# Patient Record
Sex: MALE | Race: BLACK OR AFRICAN AMERICAN | NOT HISPANIC OR LATINO | Employment: UNEMPLOYED | URBAN - METROPOLITAN AREA
[De-identification: names, ages, dates, MRNs, and addresses within clinical notes are randomized per-mention and may not be internally consistent; named-entity substitution may affect disease eponyms.]

---

## 2023-03-18 ENCOUNTER — HOSPITAL ENCOUNTER (INPATIENT)
Facility: HOSPITAL | Age: 38
LOS: 10 days | Discharge: OTHER INSTITUTION | End: 2023-03-30
Attending: EMERGENCY MEDICINE | Admitting: STUDENT IN AN ORGANIZED HEALTH CARE EDUCATION/TRAINING PROGRAM

## 2023-03-18 DIAGNOSIS — G47.00 INSOMNIA: ICD-10-CM

## 2023-03-18 DIAGNOSIS — R03.0 ELEVATED BLOOD PRESSURE READING: ICD-10-CM

## 2023-03-18 DIAGNOSIS — R45.851 SUICIDAL IDEATIONS: Primary | ICD-10-CM

## 2023-03-18 DIAGNOSIS — F33.9 MAJOR DEPRESSIVE DISORDER, RECURRENT, UNSPECIFIED (HCC): ICD-10-CM

## 2023-03-18 DIAGNOSIS — Z00.8 MEDICAL CLEARANCE FOR PSYCHIATRIC ADMISSION: ICD-10-CM

## 2023-03-18 DIAGNOSIS — E55.9 VITAMIN D DEFICIENCY: ICD-10-CM

## 2023-03-18 DIAGNOSIS — F10.11 HISTORY OF ALCOHOL ABUSE: ICD-10-CM

## 2023-03-18 LAB
ALBUMIN SERPL BCP-MCNC: 4.6 G/DL (ref 3.5–5)
ALP SERPL-CCNC: 44 U/L (ref 34–104)
ALT SERPL W P-5'-P-CCNC: 39 U/L (ref 7–52)
AMPHETAMINES SERPL QL SCN: NEGATIVE
ANION GAP SERPL CALCULATED.3IONS-SCNC: 11 MMOL/L (ref 4–13)
APTT PPP: 26 SECONDS (ref 23–37)
AST SERPL W P-5'-P-CCNC: 37 U/L (ref 13–39)
BARBITURATES UR QL: NEGATIVE
BASOPHILS # BLD AUTO: 0.04 THOUSANDS/ÂΜL (ref 0–0.1)
BASOPHILS NFR BLD AUTO: 1 % (ref 0–1)
BENZODIAZ UR QL: NEGATIVE
BILIRUB SERPL-MCNC: 1.21 MG/DL (ref 0.2–1)
BUN SERPL-MCNC: 10 MG/DL (ref 5–25)
CALCIUM SERPL-MCNC: 10 MG/DL (ref 8.4–10.2)
CHLORIDE SERPL-SCNC: 98 MMOL/L (ref 96–108)
CO2 SERPL-SCNC: 28 MMOL/L (ref 21–32)
COCAINE UR QL: NEGATIVE
CREAT SERPL-MCNC: 0.85 MG/DL (ref 0.6–1.3)
EOSINOPHIL # BLD AUTO: 0.15 THOUSAND/ÂΜL (ref 0–0.61)
EOSINOPHIL NFR BLD AUTO: 2 % (ref 0–6)
ERYTHROCYTE [DISTWIDTH] IN BLOOD BY AUTOMATED COUNT: 13.2 % (ref 11.6–15.1)
GFR SERPL CREATININE-BSD FRML MDRD: 110 ML/MIN/1.73SQ M
GLUCOSE SERPL-MCNC: 110 MG/DL (ref 65–140)
HCT VFR BLD AUTO: 45.2 % (ref 36.5–49.3)
HGB BLD-MCNC: 14.4 G/DL (ref 12–17)
IMM GRANULOCYTES # BLD AUTO: 0.03 THOUSAND/UL (ref 0–0.2)
IMM GRANULOCYTES NFR BLD AUTO: 1 % (ref 0–2)
INR PPP: 0.95 (ref 0.84–1.19)
LYMPHOCYTES # BLD AUTO: 2.46 THOUSANDS/ÂΜL (ref 0.6–4.47)
LYMPHOCYTES NFR BLD AUTO: 37 % (ref 14–44)
MAGNESIUM SERPL-MCNC: 1.8 MG/DL (ref 1.9–2.7)
MCH RBC QN AUTO: 26.7 PG (ref 26.8–34.3)
MCHC RBC AUTO-ENTMCNC: 31.9 G/DL (ref 31.4–37.4)
MCV RBC AUTO: 84 FL (ref 82–98)
METHADONE UR QL: NEGATIVE
MONOCYTES # BLD AUTO: 0.4 THOUSAND/ÂΜL (ref 0.17–1.22)
MONOCYTES NFR BLD AUTO: 6 % (ref 4–12)
NEUTROPHILS # BLD AUTO: 3.54 THOUSANDS/ÂΜL (ref 1.85–7.62)
NEUTS SEG NFR BLD AUTO: 53 % (ref 43–75)
NRBC BLD AUTO-RTO: 0 /100 WBCS
OPIATES UR QL SCN: NEGATIVE
OXYCODONE+OXYMORPHONE UR QL SCN: NEGATIVE
PCP UR QL: NEGATIVE
PLATELET # BLD AUTO: 263 THOUSANDS/UL (ref 149–390)
PLATELET # BLD AUTO: 263 THOUSANDS/UL (ref 149–390)
PMV BLD AUTO: 10.3 FL (ref 8.9–12.7)
PMV BLD AUTO: 10.3 FL (ref 8.9–12.7)
POTASSIUM SERPL-SCNC: 3.7 MMOL/L (ref 3.5–5.3)
PROT SERPL-MCNC: 8 G/DL (ref 6.4–8.4)
PROTHROMBIN TIME: 12.9 SECONDS (ref 11.6–14.5)
RBC # BLD AUTO: 5.4 MILLION/UL (ref 3.88–5.62)
SODIUM SERPL-SCNC: 137 MMOL/L (ref 135–147)
THC UR QL: NEGATIVE
WBC # BLD AUTO: 6.62 THOUSAND/UL (ref 4.31–10.16)

## 2023-03-18 RX ORDER — MAGNESIUM CHLORIDE 64 MG
64 TABLET, DELAYED RELEASE (ENTERIC COATED) ORAL ONCE
Status: COMPLETED | OUTPATIENT
Start: 2023-03-18 | End: 2023-03-18

## 2023-03-18 RX ORDER — HYDROCHLOROTHIAZIDE 25 MG/1
12.5 TABLET ORAL ONCE
Status: COMPLETED | OUTPATIENT
Start: 2023-03-18 | End: 2023-03-18

## 2023-03-18 RX ORDER — LANOLIN ALCOHOL/MO/W.PET/CERES
100 CREAM (GRAM) TOPICAL DAILY
Status: DISCONTINUED | OUTPATIENT
Start: 2023-03-19 | End: 2023-03-30 | Stop reason: HOSPADM

## 2023-03-18 RX ORDER — FOLIC ACID 1 MG/1
1 TABLET ORAL DAILY
Status: DISCONTINUED | OUTPATIENT
Start: 2023-03-19 | End: 2023-03-30 | Stop reason: HOSPADM

## 2023-03-18 RX ADMIN — HYDROCHLOROTHIAZIDE 12.5 MG: 25 TABLET ORAL at 23:46

## 2023-03-18 RX ADMIN — MAGNESIUM 64 MG (MAGNESIUM CHLORIDE) TABLET,DELAYED RELEASE 64 MG: at 22:23

## 2023-03-18 NOTE — ED PROVIDER NOTES
"History  Chief Complaint   Patient presents with   • Psychiatric Evaluation     Patient reports to ED for increased depression and vague SI  States he has become overwhelmed by his sex and alcohol addiction, Has past history of suicide attempt 5 years ago by OD on pills and alcohol  Denies HI, AH and VH  Reports he can drink up to a bottle of [place] daily  59-year-old male with past medical history of severe alcohol abuse, excessive sexual drive, presents for evaluation of passive SI  Patient tearfully reports that he is tired of his behavior secondary to his excessive sexual drive and alcoholism as these things have caused him to [place] his family and job  \"  He reports feeling very hopeless  Patient denies any active SI/HI at this time, however has a history of suicide attempt 5 years ago  He denies any visual/auditory hallucinations and denies any recent symptoms such as fever/chills, cough/cold symptoms, chest pain, abdominal pain  Patient's last drink was yesterday evening when he drank [place] big bottle of Kael\" which is his drink of choice daily  He states he is interested in getting help for both his alcoholism and excessive sexual drive  No other concerns  None       History reviewed  No pertinent past medical history  History reviewed  No pertinent surgical history  History reviewed  No pertinent family history  I have reviewed and agree with the history as documented  E-Cigarette/Vaping   • E-Cigarette Use Never User      E-Cigarette/Vaping Substances     Social History     Tobacco Use   • Smoking status: Never   • Smokeless tobacco: Never   Vaping Use   • Vaping Use: Never used   Substance Use Topics   • Alcohol use: Yes     Alcohol/week: 8 0 standard drinks     Types: 8 Shots of liquor per week   • Drug use: Never        Review of Systems   Constitutional: Negative for chills and fever  HENT: Negative for ear pain and sore throat      Eyes: Negative for pain and visual " disturbance  Respiratory: Negative for cough and shortness of breath  Cardiovascular: Negative for chest pain and palpitations  Gastrointestinal: Negative for abdominal pain and vomiting  Genitourinary: Negative for dysuria and hematuria  Musculoskeletal: Negative for arthralgias and back pain  Skin: Negative for color change and rash  Neurological: Negative for seizures and syncope  Psychiatric/Behavioral: Positive for suicidal ideas  Negative for hallucinations and self-injury  All other systems reviewed and are negative  Physical Exam  ED Triage Vitals [03/18/23 1918]   Temperature Pulse Respirations Blood Pressure SpO2   98 °F (36 7 °C) 72 20 (!) 188/111 96 %      Temp Source Heart Rate Source Patient Position - Orthostatic VS BP Location FiO2 (%)   Oral Monitor Sitting Left arm --      Pain Score       --             Orthostatic Vital Signs  Vitals:    03/18/23 1918   BP: (!) 188/111   Pulse: 72   Patient Position - Orthostatic VS: Sitting       Physical Exam  Vitals and nursing note reviewed  Constitutional:       General: He is not in acute distress  Appearance: Normal appearance  He is well-developed  He is not ill-appearing, toxic-appearing or diaphoretic  HENT:      Head: Normocephalic and atraumatic  Right Ear: External ear normal       Left Ear: External ear normal       Nose: Nose normal       Mouth/Throat:      Mouth: Mucous membranes are moist    Eyes:      Extraocular Movements: Extraocular movements intact  Conjunctiva/sclera: Conjunctivae normal    Cardiovascular:      Rate and Rhythm: Normal rate and regular rhythm  Heart sounds: No murmur heard  Pulmonary:      Effort: Pulmonary effort is normal  No respiratory distress  Breath sounds: Normal breath sounds  No wheezing or rales  Chest:      Chest wall: No tenderness  Abdominal:      General: Abdomen is flat  Palpations: Abdomen is soft  Tenderness:  There is no abdominal tenderness  There is no right CVA tenderness, left CVA tenderness, guarding or rebound  Musculoskeletal:         General: No swelling  Normal range of motion  Cervical back: Normal range of motion and neck supple  Right lower leg: No edema  Left lower leg: No edema  Skin:     General: Skin is warm and dry  Capillary Refill: Capillary refill takes less than 2 seconds  Neurological:      Mental Status: He is alert and oriented to person, place, and time  Mental status is at baseline  Psychiatric:         Behavior: Behavior normal          Thought Content: Thought content normal          Judgment: Judgment normal       Comments: Tearful         ED Medications  Medications   thiamine tablet 100 mg (has no administration in time range)   folic acid (FOLVITE) tablet 1 mg (has no administration in time range)   multivitamin-minerals (CENTRUM) tablet 1 tablet (has no administration in time range)   magnesium chloride (MAG64) EC tablet TBEC 64 mg (64 mg Oral Given 3/18/23 2223)       Diagnostic Studies  Results Reviewed     Procedure Component Value Units Date/Time    Rapid drug screen, urine [[id number]]  (Normal) Collected: 03/18/23 2138    Lab Status: Final result Specimen: Urine, Clean Catch Updated: 03/18/23 2159     Amph/Meth UR Negative     Barbiturate Ur Negative     Benzodiazepine Urine Negative     Cocaine Urine Negative     Methadone Urine Negative     Opiate Urine Negative     PCP Ur Negative     THC Urine Negative     Oxycodone Urine Negative    Narrative:      FOR MEDICAL PURPOSES ONLY  IF CONFIRMATION NEEDED PLEASE CONTACT THE LAB WITHIN 5 DAYS      Drug Screen Cutoff Levels:  AMPHETAMINE/METHAMPHETAMINES  1000 ng/mL  BARBITURATES     200 ng/mL  BENZODIAZEPINES     200 ng/mL  COCAINE      300 ng/mL  METHADONE      300 ng/mL  OPIATES      300 ng/mL  PHENCYCLIDINE     25 ng/mL  THC       50 ng/mL  OXYCODONE      100 ng/mL    Comprehensive metabolic panel [[id number]]  (Abnormal) Collected: 03/18/23 2028    Lab Status: Final result Specimen: Blood from Arm, Left Updated: 03/18/23 2122     Sodium 137 mmol/L      Potassium 3 7 mmol/L      Chloride 98 mmol/L      CO2 28 mmol/L      ANION GAP 11 mmol/L      BUN 10 mg/dL      Creatinine 0 85 mg/dL      Glucose 110 mg/dL      Calcium 10 0 mg/dL      AST 37 U/L      ALT 39 U/L      Alkaline Phosphatase 44 U/L      Total Protein 8 0 g/dL      Albumin 4 6 g/dL      Total Bilirubin 1 21 mg/dL      eGFR 110 ml/min/1 73sq m     Narrative:      [facility] guidelines for Chronic Kidney Disease (CKD):   •  Stage 1 with normal or high GFR (GFR > 90 mL/min/1 73 square meters)  •  Stage 2 Mild CKD (GFR = 60-89 mL/min/1 73 square meters)  •  Stage 3A Moderate CKD (GFR = 45-59 mL/min/1 73 square meters)  •  Stage 3B Moderate CKD (GFR = 30-44 mL/min/1 73 square meters)  •  Stage 4 Severe CKD (GFR = 15-29 mL/min/1 73 square meters)  •  Stage 5 End Stage CKD (GFR <15 mL/min/1 73 square meters)  Note: GFR calculation is accurate only with a steady state creatinine    Magnesium [[id number]]  (Abnormal) Collected: 03/18/23 2028    Lab Status: Final result Specimen: Blood from Arm, Left Updated: 03/18/23 2122     Magnesium 1 8 mg/dL     Protime-INR [[id number]]  (Normal) Collected: 03/18/23 2028    Lab Status: Final result Specimen: Blood from Arm, Left Updated: 03/18/23 2115     Protime 12 9 seconds      INR 0 95    APTT [[id number]]  (Normal) Collected: 03/18/23 2028    Lab Status: Final result Specimen: Blood from Arm, Left Updated: 03/18/23 2115     PTT 26 seconds     CBC and differential [[id number]]  (Abnormal) Collected: 03/18/23 2028    Lab Status: Final result Specimen: Blood from Arm, Left Updated: 03/18/23 2103     WBC 6 62 Thousand/uL      RBC 5 40 Million/uL      Hemoglobin 14 4 g/dL      Hematocrit 45 2 %      MCV 84 fL      MCH 26 7 pg      MCHC 31 9 g/dL      RDW 13 2 %      MPV 10 3 fL      Platelets 454 Thousands/uL      nRBC 0 /100 WBCs      Neutrophils Relative 53 %      Immat GRANS % 1 %      Lymphocytes Relative 37 %      Monocytes Relative 6 %      Eosinophils Relative 2 %      Basophils Relative 1 %      Neutrophils Absolute 3 54 Thousands/µL      Immature Grans Absolute 0 03 Thousand/uL      Lymphocytes Absolute 2 46 Thousands/µL      Monocytes Absolute 0 40 Thousand/µL      Eosinophils Absolute 0 15 Thousand/µL      Basophils Absolute 0 04 Thousands/µL     Platelet count [[id number]]  (Normal) Collected: 03/18/23 2028    Lab Status: Final result Specimen: Blood from Arm, Left Updated: 03/18/23 2103     Platelets 476 Thousands/uL      MPV 10 3 fL     Equal mix, APTT [[id number]] Collected: 03/18/23 2028    Lab Status: In process Specimen: Blood from Arm, Left Updated: 03/18/23 2101    Thrombin Time Mixing Study [[id number]] Collected: 03/18/23 2028    Lab Status: In process Specimen: Blood from Arm, Left Updated: 03/18/23 2101    Fibrinogen [[id number]] Collected: 03/18/23 2028    Lab Status: In process Specimen: Blood from Arm, Left Updated: 03/18/23 2101    Equal mix, PT / INR [[id number]] Collected: 03/18/23 2028    Lab Status: In process Specimen: Blood from Arm, Left Updated: 03/18/23 2101    Thrombin time [[id number]] Collected: 03/18/23 2028    Lab Status: In process Specimen: Blood from Arm, Left Updated: 03/18/23 2101    POCT alcohol breath test [[id number]]  (Normal) Resulted: 03/18/23 0000    Lab Status: Final result Updated: 03/18/23 2016                 No orders to display         Procedures  Procedures      ED Course  ED Course as of 03/18/23 2231   Sat Mar 18, 2023   2139 Magnesium(!): 1 8  Will replete                                         Medical Decision Making  Pt is medically clear for psychiatric placement/further evaluation  Remained stable throughout ED course  CBC, CMP, mag, coag profile, WILLIAM, UDS unremarkable aside from mild hypomagnesemia for which patient was provided IV magnesium sulfate      Given lack of any alcohol withdrawal symptoms with his last drink having been about 24 hours ago, although patient reported wanting further assistance tissue sobriety, patient is not appropriate for the detox unit at this time  Patient will undergo further assessment via ED crisis worker and will be placed to an inpatient psychiatric facility or be provided further outpatient resources  Pt understands and agreed with plan  All questions answered  No other concerns  Amount and/or Complexity of Data Reviewed  Labs: ordered  Decision-making details documented in ED Course  Risk  OTC drugs  Disposition  Final diagnoses:   Suicidal ideations   History of alcohol abuse     Time reflects when diagnosis was documented in both MDM as applicable and the Disposition within this note     Time User Action Codes Description Comment    3/18/2023 10:31 PM Willa Maurer Add [R45 851] Suicidal ideations     3/18/2023 10:31 PM LeydaIsabell Add [F10 11] History of alcohol abuse       ED Disposition     None      Follow-up Information    None         Patient's Medications    No medications on file     No discharge procedures on file  PDMP Review     None           ED Provider  Attending physically available and evaluated Monika Mosher I managed the patient along with the ED Attending      Electronically Signed by         Missael Salcido MD  03/18/23 3479

## 2023-03-19 LAB
FIBRINOGEN PPP-MCNC: 310 MG/DL (ref 227–495)
THROMBIN TIME: 18 SECONDS (ref 14.7–18.4)

## 2023-03-19 RX ORDER — LORAZEPAM 1 MG/1
1 TABLET ORAL EVERY 6 HOURS PRN
Status: DISCONTINUED | OUTPATIENT
Start: 2023-03-19 | End: 2023-03-19

## 2023-03-19 RX ORDER — HYDROCHLOROTHIAZIDE 12.5 MG/1
12.5 TABLET ORAL DAILY
Status: DISCONTINUED | OUTPATIENT
Start: 2023-03-19 | End: 2023-03-30 | Stop reason: HOSPADM

## 2023-03-19 RX ORDER — LORAZEPAM 1 MG/1
1 TABLET ORAL
Status: DISCONTINUED | OUTPATIENT
Start: 2023-03-19 | End: 2023-03-30 | Stop reason: HOSPADM

## 2023-03-19 RX ORDER — LORAZEPAM 1 MG/1
1 TABLET ORAL ONCE
Status: COMPLETED | OUTPATIENT
Start: 2023-03-19 | End: 2023-03-19

## 2023-03-19 RX ORDER — HYDROCHLOROTHIAZIDE 25 MG/1
TABLET ORAL
Status: COMPLETED
Start: 2023-03-19 | End: 2023-03-19

## 2023-03-19 RX ORDER — LORAZEPAM 1 MG/1
2 TABLET ORAL ONCE
Status: COMPLETED | OUTPATIENT
Start: 2023-03-19 | End: 2023-03-19

## 2023-03-19 RX ADMIN — LORAZEPAM 2 MG: 1 TABLET ORAL at 21:47

## 2023-03-19 RX ADMIN — HYDROCHLOROTHIAZIDE 12.5 MG: 25 TABLET ORAL at 09:06

## 2023-03-19 RX ADMIN — MULTIPLE VITAMINS W/ MINERALS TAB 1 TABLET: TAB ORAL at 09:07

## 2023-03-19 RX ADMIN — LORAZEPAM 1 MG: 1 TABLET ORAL at 02:00

## 2023-03-19 RX ADMIN — THIAMINE HCL TAB 100 MG 100 MG: 100 TAB at 09:06

## 2023-03-19 RX ADMIN — FOLIC ACID 1 MG: 1 TABLET ORAL at 09:06

## 2023-03-19 NOTE — ED NOTES
Patient had received 2 doses of hydrodiural 12 5mg earlier in  the shift for his elevated BP  Patient also had been medicated with ativan as aide in sleep which patient finally agreed to take after asking patient several times before that if he needed anything to help  He did state that he has not been sleeping well  BP this morning within a decent range and patient had stated that he slept well  He remains depressed and still has suicidal thoughts, no plan at this time and feels relatively safe here  1:1 continues at bedside  Offers no complaints       Luis Felipe Jalloh RN  03/19/23 4781

## 2023-03-19 NOTE — ED NOTES
"Patient noted to be tearful, 1:1 sitter requesting us to speak with patient  RN asked patient if he needed anything at this time, patient states \"No i'm okay\"       Eric Carpio RN  03/19/23 1128    "

## 2023-03-19 NOTE — ED NOTES
Pt presented to the ED due to worsening depression and suicidal ideation  Pt also reported sex and alcohol addictions that have become problematic causing him to lose his family/home and job  Pt recently became homeless and unemployed ~2 weeks ago  Pt does not elaborate on the sex addiction, but reports daily alcohol use recently  He denies any history of withdrawal seizures  Pt was last hospitalized 6 years ago while residing in [place]  His depression increased a couple of months ago, and symptoms have been worsening over the last 2 weeks  He does not currently have any outpatient supports or prescribed a medication regimen  He denies any substance abuse  He denies any access to firearms  Pt reports thinking of several different plans/ways he could harm himself if discharged, but does not elaborate further  Pt reports a prior suicide attempt via overdose on pills leading up to his last hospitalization  He denies any legal issues  He reports decreased sleep and decreased appetite  Pt denies HI/AH/VH  Pt is in agreement with signing a 201  He does not feel he is safe to participate in alcohol use treatment without being stablized first with his mental health  ED attending isis sifuentes  Pt understands his voluntary rights

## 2023-03-19 NOTE — ED NOTES
EVS (Eligibility Verification System) called - [phone]  Automated system indicates: Pt SSN is not on file, cannot verify eligibility     Per [facility], Pt is inactive

## 2023-03-19 NOTE — ED NOTES
Patient is speaking with psychiatry, 1:1 remains at the bedside       Rajesh River RN  03/19/23 0268

## 2023-03-19 NOTE — ED NOTES
Pt provided with eye mask/ear plugs to assist with falling asleep  Nurse also medicated for the same  Pt states after trying ear plugs for a few minutes that he can't use them because of voices in his head  Pt encouraged to utilize mask  Pt has no other needs @ this time  All safety precautions remain in place including 1:1 @ bedside       Alex Hopkins  03/19/23 4562

## 2023-03-19 NOTE — ED NOTES
Assumed care of pt at this time  Pt resting with no signs of distress noted  1:1 in progress  Will continue to monitor        Himanshu Lee RN  03/19/23 3636
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
## 2023-03-19 NOTE — ED ATTENDING ATTESTATION
Panel Management Review      Patient has the following on his problem list: None      Composite cancer screening  Chart review shows that this patient is due/due soon for the following Colonoscopy  Summary:    Patient is due/failing the following:   COLONOSCOPY    Action needed:   Patient needs office visit for colonoscopy.    Type of outreach:    Sent letter.    Questions for provider review:    None                                                                                                                                    Jaime Crews [facility]       Chart routed to Care Team .           3/18/2023  ITamela MD, saw and evaluated the patient  I have discussed the patient with the resident/non-physician practitioner and agree with the resident's/non-physician practitioner's findings, Plan of Care, and MDM as documented in the resident's/non-physician practitioner's note, except where noted  All available labs and Radiology studies were reviewed  I was present for key portions of any procedure(s) performed by the resident/non-physician practitioner and I was immediately available to provide assistance  At this point I agree with the current assessment done in the Emergency Department  I have conducted an independent evaluation of this patient a history and physical is as follows:    ED Course   59-year-old male past medical history of severe alcohol abuse presenting here today for alcohol withdrawal and passive SI no history of complicated withdrawal no significant withdrawal symptoms at this time vital signs with hypertension otherwise reassuring  Physical exam reassuring  Does report worsening depression and passive suicidal ideation without any attempt or specific plan  Has been drinking [place] daily  Will obtain labs and continue to monitor for withdrawal   Alcohol is negative here and he developed no signs of acute withdrawal low concern for DVT or withdrawal seizures  Will discuss with crisis regarding SI  He did sign a 201  Signed out pending bed placement  Labs reassuring        Critical Care Time  Procedures

## 2023-03-20 RX ORDER — PROPRANOLOL HYDROCHLORIDE 10 MG/1
10 TABLET ORAL EVERY 8 HOURS PRN
Status: DISCONTINUED | OUTPATIENT
Start: 2023-03-20 | End: 2023-03-30 | Stop reason: HOSPADM

## 2023-03-20 RX ORDER — OLANZAPINE 10 MG/1
2.5 INJECTION, POWDER, LYOPHILIZED, FOR SOLUTION INTRAMUSCULAR
Status: DISCONTINUED | OUTPATIENT
Start: 2023-03-20 | End: 2023-03-30 | Stop reason: HOSPADM

## 2023-03-20 RX ORDER — OLANZAPINE 10 MG/1
5 INJECTION, POWDER, LYOPHILIZED, FOR SOLUTION INTRAMUSCULAR
Status: DISCONTINUED | OUTPATIENT
Start: 2023-03-20 | End: 2023-03-30 | Stop reason: HOSPADM

## 2023-03-20 RX ORDER — POLYETHYLENE GLYCOL 3350 17 G/17G
17 POWDER, FOR SOLUTION ORAL DAILY PRN
Status: DISCONTINUED | OUTPATIENT
Start: 2023-03-20 | End: 2023-03-30 | Stop reason: HOSPADM

## 2023-03-20 RX ORDER — ACETAMINOPHEN 325 MG/1
650 TABLET ORAL EVERY 6 HOURS PRN
Status: DISCONTINUED | OUTPATIENT
Start: 2023-03-20 | End: 2023-03-30 | Stop reason: HOSPADM

## 2023-03-20 RX ORDER — MAGNESIUM HYDROXIDE/ALUMINUM HYDROXICE/SIMETHICONE 120; 1200; 1200 MG/30ML; MG/30ML; MG/30ML
30 SUSPENSION ORAL EVERY 4 HOURS PRN
Status: DISCONTINUED | OUTPATIENT
Start: 2023-03-20 | End: 2023-03-30 | Stop reason: HOSPADM

## 2023-03-20 RX ORDER — HYDROXYZINE HYDROCHLORIDE 25 MG/1
25 TABLET, FILM COATED ORAL
Status: DISCONTINUED | OUTPATIENT
Start: 2023-03-20 | End: 2023-03-30 | Stop reason: HOSPADM

## 2023-03-20 RX ORDER — OLANZAPINE 5 MG/1
5 TABLET ORAL
Status: DISCONTINUED | OUTPATIENT
Start: 2023-03-20 | End: 2023-03-30 | Stop reason: HOSPADM

## 2023-03-20 RX ORDER — HYDROXYZINE 50 MG/1
100 TABLET, FILM COATED ORAL
Status: DISCONTINUED | OUTPATIENT
Start: 2023-03-20 | End: 2023-03-30 | Stop reason: HOSPADM

## 2023-03-20 RX ORDER — TRAZODONE HYDROCHLORIDE 50 MG/1
50 TABLET ORAL
Status: DISCONTINUED | OUTPATIENT
Start: 2023-03-20 | End: 2023-03-30 | Stop reason: HOSPADM

## 2023-03-20 RX ORDER — LORAZEPAM 2 MG/ML
2 INJECTION INTRAMUSCULAR EVERY 6 HOURS PRN
Status: DISCONTINUED | OUTPATIENT
Start: 2023-03-20 | End: 2023-03-30 | Stop reason: HOSPADM

## 2023-03-20 RX ORDER — DIPHENHYDRAMINE HYDROCHLORIDE 50 MG/ML
50 INJECTION INTRAMUSCULAR; INTRAVENOUS EVERY 6 HOURS PRN
Status: DISCONTINUED | OUTPATIENT
Start: 2023-03-20 | End: 2023-03-30 | Stop reason: HOSPADM

## 2023-03-20 RX ORDER — BENZTROPINE MESYLATE 1 MG/1
1 TABLET ORAL
Status: DISCONTINUED | OUTPATIENT
Start: 2023-03-20 | End: 2023-03-30 | Stop reason: HOSPADM

## 2023-03-20 RX ORDER — AMOXICILLIN 250 MG
1 CAPSULE ORAL DAILY PRN
Status: DISCONTINUED | OUTPATIENT
Start: 2023-03-20 | End: 2023-03-30 | Stop reason: HOSPADM

## 2023-03-20 RX ORDER — ACETAMINOPHEN 325 MG/1
975 TABLET ORAL EVERY 6 HOURS PRN
Status: DISCONTINUED | OUTPATIENT
Start: 2023-03-20 | End: 2023-03-30 | Stop reason: HOSPADM

## 2023-03-20 RX ORDER — BENZTROPINE MESYLATE 1 MG/ML
1 INJECTION INTRAMUSCULAR; INTRAVENOUS
Status: DISCONTINUED | OUTPATIENT
Start: 2023-03-20 | End: 2023-03-30 | Stop reason: HOSPADM

## 2023-03-20 RX ORDER — HYDROXYZINE 50 MG/1
50 TABLET, FILM COATED ORAL
Status: DISCONTINUED | OUTPATIENT
Start: 2023-03-20 | End: 2023-03-30 | Stop reason: HOSPADM

## 2023-03-20 RX ORDER — ACETAMINOPHEN 325 MG/1
650 TABLET ORAL EVERY 4 HOURS PRN
Status: DISCONTINUED | OUTPATIENT
Start: 2023-03-20 | End: 2023-03-30 | Stop reason: HOSPADM

## 2023-03-20 RX ORDER — OLANZAPINE 2.5 MG/1
2.5 TABLET ORAL
Status: DISCONTINUED | OUTPATIENT
Start: 2023-03-20 | End: 2023-03-30 | Stop reason: HOSPADM

## 2023-03-20 RX ADMIN — MULTIPLE VITAMINS W/ MINERALS TAB 1 TABLET: TAB ORAL at 09:45

## 2023-03-20 RX ADMIN — FOLIC ACID 1 MG: 1 TABLET ORAL at 09:45

## 2023-03-20 RX ADMIN — THIAMINE HCL TAB 100 MG 100 MG: 100 TAB at 09:45

## 2023-03-20 RX ADMIN — HYDROCHLOROTHIAZIDE 12.5 MG: 25 TABLET ORAL at 09:45

## 2023-03-20 RX ADMIN — PROPRANOLOL HYDROCHLORIDE 10 MG: 10 TABLET ORAL at 21:03

## 2023-03-20 RX ADMIN — TRAZODONE HYDROCHLORIDE 50 MG: 50 TABLET ORAL at 21:03
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
## 2023-03-20 NOTE — ED NOTES
Patient discharged by error  Patient continues to stay in ER  Resting comfortably  1:1 continues        Danielle Harris RN  03/20/23 [id number]

## 2023-03-20 NOTE — NURSING NOTE
45year old male admitted on 12 from [facility]  ED Patient is self admitted after worsening depression and suicidal ideation  Pt also reported sex and alcohol addictions that have become problematic causing him to lose his family/home and job  Pt recently became homeless and unemployed ~2 weeks ago  On admission  PT is AAOx4,  pleasant, and is cooperative with [employer] and body assessment  However, PT reported 3/4 anxiety, and 8/10 depression  Patient is denying any SI, HI, visual hallucinations, or pain at this time  Pt reported H/o SI in 2015 by OD with oxy , and drinking alcohol  Pt [facility] drug use   Pt  was educated on medication compliance, proper hydration, and when to come to staff if anxiety or depression becomes overwhelming  Will continue to monitor and provide therapeutic support  Oriented to unit and regulations; unit expectations reviewed  Q 7 minute checks initiated  CSSRS Moderate  Risk, reviewed case w Charles Bautista of [place], 1:1 not indicated at this time

## 2023-03-20 NOTE — PLAN OF CARE
Problem: DISCHARGE PLANNING  Goal: Discharge to home or other facility with appropriate resources  Description: INTERVENTIONS:  - Identify barriers to discharge w/patient and caregiver  - Arrange for needed discharge resources and transportation as appropriate  - Identify discharge learning needs (meds, wound care, etc )  - Arrange for interpretive services to assist at discharge as needed  - Refer to Case Management Department for coordinating discharge planning if the patient needs post-hospital services based on physician/advanced practitioner order or complex needs related to functional status, cognitive ability, or social support system  Outcome: Progressing     Problem: SELF HARM/SUICIDALITY  Goal: Will have no self-injury during hospital stay  Description: INTERVENTIONS:  - Q 15 MINUTES: Routine safety checks  - Q WAKING SHIFT & PRN: Assess risk to determine if routine checks are adequate to maintain patient safety  - Encourage patient to participate actively in care by formulating a plan to combat response to suicidal ideation, identify supports and resources  Outcome: Progressing     Problem: DEPRESSION  Goal: Will be euthymic at discharge  Description: INTERVENTIONS:  - Administer medication as ordered  - Provide emotional support via 1:1 interaction with staff  - Encourage involvement in milieu/groups/activities  - Monitor for social isolation  Outcome: Progressing     Problem: ANXIETY  Goal: Will report anxiety at manageable levels  Description: INTERVENTIONS:  - Administer medication as ordered  - Teach and encourage coping skills  - Provide emotional support  - Assess patient/family for anxiety and ability to cope  Outcome: Progressing  Goal: By discharge: Patient will verbalize 2 strategies to deal with anxiety  Description: Interventions:  - Identify any obvious source/trigger to anxiety  - Staff will assist patient in applying identified coping technique/skills  - Encourage attendance of scheduled groups and activities  Outcome: Progressing     Problem: SUBSTANCE USE/ABUSE  Goal: Will have no detox symptoms and will verbalize plan for changing substance-related behavior  Description: INTERVENTIONS:  - Monitor physical status and assess for symptoms of withdrawal  - Administer medication as ordered  - Provide emotional support with 1 on 1 interaction with staff  - Encourage recovery focused program/ addiction education  - Assess for verbalization of changing behaviors related to substance abuse  - Initiate consults and referrals as appropriate (Case Management, Spiritual Care, etc )  Outcome: Progressing  Goal: By discharge, will develop insight into their chemical dependency and sustain motivation to continue in recovery  Description: INTERVENTIONS:  - Attends all daily group sessions and scheduled AA groups  - Actively practices coping skills through participation in the therapeutic community and adherence to program rules  - Reviews and completes assignments from individual treatment plan  - Assist patient development of understanding of their personal cycle of addiction and relapse triggers  Outcome: Progressing  Goal: By discharge, patient will have ongoing treatment plan addressing chemical dependency  Description: INTERVENTIONS:  - Assist patient with resources and/or appointments for ongoing recovery based living  Outcome: Progressing

## 2023-03-21 PROBLEM — R03.0 ELEVATED BLOOD PRESSURE READING: Status: ACTIVE | Noted: 2023-03-21

## 2023-03-21 PROBLEM — Z00.8 MEDICAL CLEARANCE FOR PSYCHIATRIC ADMISSION: Status: ACTIVE | Noted: 2023-03-21

## 2023-03-21 PROBLEM — E78.5 ELEVATED LIPIDS: Status: ACTIVE | Noted: 2023-03-21

## 2023-03-21 PROBLEM — E66.01 CLASS 3 SEVERE OBESITY DUE TO EXCESS CALORIES WITHOUT SERIOUS COMORBIDITY WITH BODY MASS INDEX (BMI) OF 45.0 TO 49.9 IN ADULT (HCC): Status: ACTIVE | Noted: 2023-03-21

## 2023-03-21 PROBLEM — E55.9 VITAMIN D DEFICIENCY: Status: ACTIVE | Noted: 2023-03-21

## 2023-03-21 PROBLEM — F33.9 MAJOR DEPRESSIVE DISORDER, RECURRENT, UNSPECIFIED (HCC): Status: ACTIVE | Noted: 2023-03-21

## 2023-03-21 LAB
25(OH)D3 SERPL-MCNC: 10.2 NG/ML (ref 30–100)
ALBUMIN SERPL BCP-MCNC: 4.2 G/DL (ref 3.5–5)
ALP SERPL-CCNC: 37 U/L (ref 34–104)
ALT SERPL W P-5'-P-CCNC: 31 U/L (ref 7–52)
ANION GAP SERPL CALCULATED.3IONS-SCNC: 11 MMOL/L (ref 4–13)
AST SERPL W P-5'-P-CCNC: 26 U/L (ref 13–39)
ATRIAL RATE: 86 BPM
BASOPHILS # BLD AUTO: 0.05 THOUSANDS/ÂΜL (ref 0–0.1)
BASOPHILS NFR BLD AUTO: 1 % (ref 0–1)
BILIRUB SERPL-MCNC: 0.76 MG/DL (ref 0.2–1)
BUN SERPL-MCNC: 10 MG/DL (ref 5–25)
CALCIUM SERPL-MCNC: 10 MG/DL (ref 8.4–10.2)
CHLORIDE SERPL-SCNC: 97 MMOL/L (ref 96–108)
CHOLEST SERPL-MCNC: 241 MG/DL
CO2 SERPL-SCNC: 29 MMOL/L (ref 21–32)
CREAT SERPL-MCNC: 0.95 MG/DL (ref 0.6–1.3)
EOSINOPHIL # BLD AUTO: 0.18 THOUSAND/ÂΜL (ref 0–0.61)
EOSINOPHIL NFR BLD AUTO: 2 % (ref 0–6)
ERYTHROCYTE [DISTWIDTH] IN BLOOD BY AUTOMATED COUNT: 13.2 % (ref 11.6–15.1)
FOLATE SERPL-MCNC: 16.8 NG/ML (ref 3.1–17.5)
GFR SERPL CREATININE-BSD FRML MDRD: 101 ML/MIN/1.73SQ M
GLUCOSE P FAST SERPL-MCNC: 109 MG/DL (ref 65–99)
GLUCOSE SERPL-MCNC: 109 MG/DL (ref 65–140)
HCT VFR BLD AUTO: 45 % (ref 36.5–49.3)
HDLC SERPL-MCNC: 39 MG/DL
HGB BLD-MCNC: 14.6 G/DL (ref 12–17)
IMM GRANULOCYTES # BLD AUTO: 0.02 THOUSAND/UL (ref 0–0.2)
IMM GRANULOCYTES NFR BLD AUTO: 0 % (ref 0–2)
LDLC SERPL CALC-MCNC: 169 MG/DL (ref 0–100)
LYMPHOCYTES # BLD AUTO: 3.74 THOUSANDS/ÂΜL (ref 0.6–4.47)
LYMPHOCYTES NFR BLD AUTO: 51 % (ref 14–44)
MCH RBC QN AUTO: 27.2 PG (ref 26.8–34.3)
MCHC RBC AUTO-ENTMCNC: 32.4 G/DL (ref 31.4–37.4)
MCV RBC AUTO: 84 FL (ref 82–98)
MONOCYTES # BLD AUTO: 0.43 THOUSAND/ÂΜL (ref 0.17–1.22)
MONOCYTES NFR BLD AUTO: 6 % (ref 4–12)
NEUTROPHILS # BLD AUTO: 2.94 THOUSANDS/ÂΜL (ref 1.85–7.62)
NEUTS SEG NFR BLD AUTO: 40 % (ref 43–75)
NONHDLC SERPL-MCNC: 202 MG/DL
NRBC BLD AUTO-RTO: 0 /100 WBCS
P AXIS: 42 DEGREES
PLATELET # BLD AUTO: 263 THOUSANDS/UL (ref 149–390)
PMV BLD AUTO: 10.1 FL (ref 8.9–12.7)
POTASSIUM SERPL-SCNC: 3.9 MMOL/L (ref 3.5–5.3)
PR INTERVAL: 160 MS
PROT SERPL-MCNC: 7.3 G/DL (ref 6.4–8.4)
QRS AXIS: 25 DEGREES
QRSD INTERVAL: 96 MS
QT INTERVAL: 368 MS
QTC INTERVAL: 440 MS
RBC # BLD AUTO: 5.36 MILLION/UL (ref 3.88–5.62)
SODIUM SERPL-SCNC: 137 MMOL/L (ref 135–147)
T WAVE AXIS: 64 DEGREES
TREPONEMA PALLIDUM IGG+IGM AB [PRESENCE] IN SERUM OR PLASMA BY IMMUNOASSAY: NORMAL
TRIGL SERPL-MCNC: 164 MG/DL
TSH SERPL DL<=0.05 MIU/L-ACNC: 3.73 UIU/ML (ref 0.45–4.5)
VENTRICULAR RATE: 86 BPM
VIT B12 SERPL-MCNC: 370 PG/ML (ref 100–900)
WBC # BLD AUTO: 7.36 THOUSAND/UL (ref 4.31–10.16)

## 2023-03-21 RX ORDER — ERGOCALCIFEROL 1.25 MG/1
50000 CAPSULE ORAL WEEKLY
Status: DISCONTINUED | OUTPATIENT
Start: 2023-03-21 | End: 2023-03-30 | Stop reason: HOSPADM

## 2023-03-21 RX ORDER — ESCITALOPRAM OXALATE 5 MG/1
5 TABLET ORAL DAILY
Status: DISCONTINUED | OUTPATIENT
Start: 2023-03-21 | End: 2023-03-24

## 2023-03-21 RX ADMIN — THIAMINE HCL TAB 100 MG 100 MG: 100 TAB at 09:28

## 2023-03-21 RX ADMIN — HYDROCHLOROTHIAZIDE 12.5 MG: 25 TABLET ORAL at 09:28

## 2023-03-21 RX ADMIN — INFLUENZA VIRUS VACCINE 0.5 ML: 15; 15; 15; 15 SUSPENSION INTRAMUSCULAR at 12:09

## 2023-03-21 RX ADMIN — ERGOCALCIFEROL 50000 UNITS: 1.25 CAPSULE ORAL at 12:02

## 2023-03-21 RX ADMIN — ESCITALOPRAM OXALATE 5 MG: 5 TABLET, FILM COATED ORAL at 13:02

## 2023-03-21 RX ADMIN — MULTIPLE VITAMINS W/ MINERALS TAB 1 TABLET: TAB ORAL at 09:28

## 2023-03-21 RX ADMIN — FOLIC ACID 1 MG: 1 TABLET ORAL at 09:28

## 2023-03-21 NOTE — NURSING NOTE
Patient awake at this time and does not report any signs or symptoms of withdrawal  Blood pressure was 140/86  Scored 0 for CIWA

## 2023-03-21 NOTE — PROGRESS NOTES
"The writer completed Angel Luis's Admission Self-Assessment  Chastitymarc Adams states that the stressor leading to his admission was \"hitting rock bottom\" and losing his job, house and family due to his alcohol and sex addictions  He has no insight at this time as to any underlying reasons propelling him towards his addictions  At this time, Martinnela Russellkp is concerned that he won't be able to control his addictions, which is necessary for him to have his life back  Ricky Adams stated that if he continued with his addictions, Sofía Resendiz might as well go to [place] and overdose on Heroin  \"      Ricky Wellskp graduated from college and states that he has two master's degrees  He enjoys reading, music, singing and sports  Ricky Adams indicated that he is a good father and that his wife would agree  He used to be active in his [employer] but he distanced himself as he believed his behavior was inconsistent with [employer] teachings  At the hospital, Chastitymarc Adams would like to work on self- esteem, improving his concentration and memory, knowledge about his illness and healthy lifestyles  His goal is to reconcile with his family  The writer found [employer] and cooperative  He is aware of the consequences of his conduct and has anger towards himself and low self-esteem as a result  He feels a lack of control over his addictions, which triggers his suicidal ideations  Chastitymarc Adams would benefit from learning impulse control and how to manage his addictive behavior  Martinnela Russellkp would benefit attending groups and on working to uncover the underlying triggers to his addictions    "
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
## 2023-03-21 NOTE — ASSESSMENT & PLAN NOTE
· Vitamin D level at 10 2 today  · Supplement with vitamin D2 50,000 units weekly for 8 weeks  · Recheck vitamin D level in 8 to 10 weeks

## 2023-03-21 NOTE — PROGRESS NOTES
03/21/23 1300   Activity/Group Checklist   Group Other (Comment)  (Recovery group)   Attendance Attended   Attendance Duration (min) 31-45   Interactions Interacted appropriately   Affect/Mood Appropriate   Goals Achieved Able to engage in interactions; Able to listen to others; Able to reflect/comment on own behavior
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
## 2023-03-21 NOTE — NURSING NOTE
Pleasant and cooperative  About the unit  Attending groups and interacting with peers appropriately  Denies SI  Reports depression  Denies alcohol w/d symptoms

## 2023-03-21 NOTE — ASSESSMENT & PLAN NOTE
· Patient with elevated blood pressure while in the ED    · Patient denies any formal diagnosis of hypertension  · Was initiated on HCTZ 12 5 mg daily in the ED  · Continue while inpatient

## 2023-03-21 NOTE — TREATMENT PLAN
TREATMENT PLAN REVIEW - [address] 45 y o  1985 male MRN: [id number]    [address] [address] Room / Bed: [place] [address]/[facility] 340- Encounter: [id number]          Admit Date/Time:  3/18/2023  7:10 PM    Treatment Team: Attending Provider: Amrik Marroquin MD; Consulting Physician: Scott iMxon MD; Patient Care Assistant: Brinda Boateng; Registered Nurse: China Drake RN; Registered Nurse: Angela Flaherty RN; : Lorie Wolfe; Patient Care Technician: Nilay Evangelista; Patient Care Assistant: Aracely Gil; Registered Nurse: Colin Jerome RN; Patient Care Assistant: Soto Blancas;  Patient Care Assistant: Nyasia Horan    Diagnosis: Principal Problem:    Major depressive disorder, recurrent, unspecified ([facility] [address] )  Active Problems:    Elevated blood pressure reading    Medical clearance for psychiatric admission    Vitamin D deficiency    Elevated lipids    Class 3 severe obesity due to excess calories without serious comorbidity with body mass index (BMI) of 45 0 to 49 9 in adult [facility])      Patient Strengths/Assets: ability for insight, cooperative, communication skills, interpersonal skills, motivated, negotiates basic needs, patient is on a voluntary commitment, reasoning ability, well educated, work skills    Patient Barriers/Limitations: difficulty adapting, homeless, lack of social/family support, lack of stable employment, self-care deficit, substance abuse    Short Term Goals: decrease in depressive symptoms, decrease in anxiety symptoms, decrease in suicidal thoughts, ability to stay safe on the unit, ability to stay free of restraints, improvement in ability to express basic needs, improvement in insight, improvement in reasoning ability, improvement in self care, sleep improvement, improvement in appetite, acceptance of need for psychiatric treatment, acceptance of psychiatric medications    Long Term Goals: improvement in depression, improvement in anxiety, free of suicidal thoughts, free of homicidal thoughts, no self abusive behavior, improvement in reasoning ability, improved insight, able to express basic needs, acceptance of need for psychiatric medications, acceptance of need for psychiatric treatment, acceptance of need for psychiatric follow up after discharge, acceptance of psychiatric diagnosis, adequate self care, adequate sleep, adequate appetite, appropriate interaction with peers, appropriate interaction with family, stable living arrangements upon discharge, establishment of family support upon discharge    Progress Towards Goals: starting psychiatric medications as prescribed, progressing, no longer suicidal    Recommended Treatment: medication management, patient medication education, group therapy, milieu therapy, continued Behavioral Health psychiatric evaluation/assessment process    Treatment Frequency: daily medication monitoring, group and milieu therapy daily, monitoring through interdisciplinary rounds, monitoring through weekly patient care conferences    Expected Discharge Date:  3/31/23    Discharge Plan: placement in inpatient drug and alcohol rehabilitation program    Treatment Plan Created/Updated By: Holley Ayala MD

## 2023-03-21 NOTE — CONSULTS
[address] 1985, 45 y o  male MRN: [id number]  Unit/Bed#: U 340-01 Encounter: [id number]  Primary Care Provider: No primary care provider on file  Date and time admitted to hospital: 3/18/2023  7:10 PM    Inpatient consult for Medical Clearance for [facility] patient  Consult performed by: Korin Sterling PA-C  Consult ordered by: Lorenzo Lozoya MD          Medical clearance for psychiatric admission  Assessment & Plan  · Vital signs stable  Reviewed admission labs  Patient is medically cleared for admission to the [facility] for treatment of the underlying psychiatric illness  Slim will sign off  Please call with questions or concerns    Class 3 severe obesity due to excess calories without serious comorbidity with body mass index (BMI) of 45 0 to 49 9 in adult [facility])  Assessment & Plan  · BMI 48 02  · Recommend lifestyle modifications     Elevated lipids  Assessment & Plan  · Lipid panel on 3/21/2023 significant for  · Total cholesterol: 241  · Triglycerides: 164  · HDL 39  · LDL: 169  · Patient does not age requirement for ASCVD  · Patient would benefit from initiation of statin therapy - recommend atorvastatin 10mg QD  · Follow up with PCP for monitoring    Vitamin D deficiency  Assessment & Plan  · Vitamin D level at 10 2 today  · Supplement with vitamin D2 50,000 units weekly for 8 weeks  · Recheck vitamin D level in 8 to 10 weeks    Elevated blood pressure reading  Assessment & Plan  · Patient with elevated blood pressure while in the ED    · Patient denies any formal diagnosis of hypertension  · Was initiated on HCTZ 12 5 mg daily in the ED  · Continue while inpatient    ECT Clearance:  • History of recent seizure or stroke:  No - reports TIA in 2021  • History of pheochromocytoma:  no  • History of active bleeding (Intracranial hemorrhage, aneurysm or AVM):  no  • History of metallic implants in the head or neck:  no  • History of increased intracranial pressure with mass effect:  no  ·   EKG within 3 months? No EKG on file  o If yes, was an arrhythmia present and at baseline?  o If yes, what is the baseline QT interval?  o If no, obtain prior to ECT for arrhythmia evaluation and baseline QT interval     Based on above criteria, Patient is not medically cleared for ECT should it be recommended  Counseling / Coordination of Care Time: 45 minutes  Greater than 50% of total time spent on patient counseling and coordination of care  Collaboration of Care: Were Recommendations Directly Discussed with Primary Treatment Team? - No     History of Present Illness:    Jimmy Sheth is a 45 y o  male who is originally admitted to the psychiatry service due to increasing depression and SI  We are consulted for medical clearance for admission to [facility] Unit and treatment of underlying psychiatric illness  This patient has no significant past medical history  Per chart review he initially presented to the ED for Si 2/2 to sex addition and alcoholism  On evaluation patient denies any physical complaints such as headache, dizziness, chest pain, shortness of breath, nausea/vomiting/diarrhea at this time  Review of Systems:    Review of Systems   Constitutional: Negative for chills, diaphoresis and fever  HENT: Negative for congestion, rhinorrhea, sinus pressure, sinus pain and sore throat  Eyes: Negative for pain and visual disturbance  Respiratory: Negative for cough, shortness of breath and wheezing  Cardiovascular: Negative for chest pain and palpitations  Gastrointestinal: Negative for abdominal distention, abdominal pain, constipation, diarrhea, nausea and vomiting  Genitourinary: Negative for difficulty urinating, dysuria, frequency, hematuria and urgency  Musculoskeletal: Negative for arthralgias, back pain and myalgias  Skin: Negative for rash     Neurological: Negative for dizziness, weakness, "light-headedness and headaches  All other systems reviewed and are negative  Past Medical and Surgical History:     History reviewed  No pertinent past medical history  History reviewed  No pertinent surgical history  Meds/Allergies:    all medications and allergies reviewed    Allergies: No Known Allergies    Social History:     Marital Status: Single    Substance Use History:   Social History     Substance and Sexual Activity   Alcohol Use Yes   • Alcohol/week: 8 0 standard drinks   • Types: 8 Shots of liquor per week     Social History     Tobacco Use   Smoking Status Never   Smokeless Tobacco Never     Social History     Substance and Sexual Activity   Drug Use Never       Family History:    non-contributory    Physical Exam:     Vitals:   Blood Pressure: 112/62 (03/21/23 0900)  Pulse: 83 (03/21/23 0900)  Temperature: 97 8 °F (36 6 °C) (03/21/23 0900)  Temp Source: Temporal (03/21/23 0900)  Respirations: 16 (03/21/23 0900)  Height: 5' 8\" (172 7 cm) (03/20/23 1833)  Weight - Scale: (!) 143 kg (315 lb 12 8 oz) (03/20/23 1833)  SpO2: 98 % (03/21/23 0900)    Physical Exam  Constitutional:       General: He is not in acute distress  Appearance: Normal appearance  He is obese  He is not ill-appearing, toxic-appearing or diaphoretic  HENT:      Head: Normocephalic and atraumatic  Nose: Nose normal  No congestion or rhinorrhea  Mouth/Throat:      Mouth: Mucous membranes are moist    Eyes:      General: No scleral icterus  Extraocular Movements: Extraocular movements intact  Cardiovascular:      Rate and Rhythm: Normal rate and regular rhythm  Heart sounds: Normal heart sounds  No murmur heard  Pulmonary:      Effort: Pulmonary effort is normal  No respiratory distress  Breath sounds: Normal breath sounds  No wheezing  Abdominal:      General: Abdomen is flat  Bowel sounds are normal  There is no distension  Palpations: Abdomen is soft  Tenderness:  There is no " abdominal tenderness  Musculoskeletal:      Right lower leg: No edema  Left lower leg: No edema  Skin:     General: Skin is warm and dry  Coloration: Skin is not jaundiced  Neurological:      General: No focal deficit present  Mental Status: He is alert and oriented to person, place, and time  Additional Data:     Lab Results: I have personally reviewed pertinent reports  Results from last 7 days   Lab Units 03/21/23  0515   WBC Thousand/uL 7 36   HEMOGLOBIN g/dL 14 6   HEMATOCRIT % 45 0   PLATELETS Thousands/uL 263   NEUTROS PCT % 40*   LYMPHS PCT % 51*   MONOS PCT % 6   EOS PCT % 2     Results from last 7 days   Lab Units 03/21/23  0515   SODIUM mmol/L 137   POTASSIUM mmol/L 3 9   CHLORIDE mmol/L 97   CO2 mmol/L 29   BUN mg/dL 10   CREATININE mg/dL 0 95   ANION GAP mmol/L 11   CALCIUM mg/dL 10 0   ALBUMIN g/dL 4 2   TOTAL BILIRUBIN mg/dL 0 76   ALK PHOS U/L 37   ALT U/L 31   AST U/L 26   GLUCOSE RANDOM mg/dL 109     Results from last 7 days   Lab Units 03/18/23 2028   INR  0 95         No results found for: HGBA1C        EKG, Pathology, and Other Studies Reviewed on Admission:   · EKG no current EKG on file    ** Please Note: This note has been constructed using a voice recognition system   **

## 2023-03-21 NOTE — ASSESSMENT & PLAN NOTE
· Lipid panel on 3/21/2023 significant for  · Total cholesterol: 241  · Triglycerides: 164  · HDL 39  · LDL: 169  · Patient does not age requirement for ASCVD  · Patient would benefit from initiation of statin therapy - recommend atorvastatin 10mg QD  · Follow up with PCP for monitoring

## 2023-03-21 NOTE — ASSESSMENT & PLAN NOTE
· Vital signs stable  Reviewed admission labs  Patient is medically cleared for admission to the [facility] for treatment of the underlying psychiatric illness  Slim will sign off    Please call with questions or concerns

## 2023-03-21 NOTE — NURSING NOTE
Patient c/o feeling restless  Patient recived inderal  Patient also c/o insomnia, and received trazodone   Patient remain Q 7 min check

## 2023-03-22 RX ORDER — NALTREXONE HYDROCHLORIDE 50 MG/1
25 TABLET, FILM COATED ORAL DAILY
Status: DISCONTINUED | OUTPATIENT
Start: 2023-03-22 | End: 2023-03-23

## 2023-03-22 RX ADMIN — ESCITALOPRAM OXALATE 5 MG: 5 TABLET, FILM COATED ORAL at 08:19

## 2023-03-22 RX ADMIN — TRAZODONE HYDROCHLORIDE 50 MG: 50 TABLET ORAL at 21:06

## 2023-03-22 RX ADMIN — NALTREXONE HYDROCHLORIDE 25 MG: 50 TABLET, FILM COATED ORAL at 12:27

## 2023-03-22 RX ADMIN — FOLIC ACID 1 MG: 1 TABLET ORAL at 08:19

## 2023-03-22 RX ADMIN — HYDROCHLOROTHIAZIDE 12.5 MG: 25 TABLET ORAL at 08:19

## 2023-03-22 RX ADMIN — THIAMINE HCL TAB 100 MG 100 MG: 100 TAB at 08:19

## 2023-03-22 RX ADMIN — MULTIPLE VITAMINS W/ MINERALS TAB 1 TABLET: TAB ORAL at 08:19

## 2023-03-22 NOTE — NURSING NOTE
"Patient has been about the unit  Appropriate, pleasant, calm and cooperative  Attending groups and interacting with select peers appropriately  Denies SI  Continues to c/o depression  Reports that he didn't sleep well last night due to having racing thoughts regarding his stressors prior to admission  \" I know my marriage is over\"  \" I messed that up\"  States that he wants to get the help he needs so he can be a good father to his child  Compliant with morning dose of Lexapro    "

## 2023-03-22 NOTE — PROGRESS NOTES
"Progress Note - Shaina [age] y o  male MRN: [id number]  Unit/Bed#: [facility] 340-01 Encounter: [id number]    Assessment/Plan   Principal Problem:    Major depressive disorder, recurrent, unspecified ([address] )  Active Problems:    Elevated blood pressure reading    Medical clearance for psychiatric admission    Vitamin D deficiency    Elevated lipids    Class 3 severe obesity due to excess calories without serious comorbidity with body mass index (BMI) of 45 0 to 49 9 in [facility])      Recommended Treatment:   Start naltrexone 25 mg daily, for alcohol use disorder, may increase to 50 mg tomorrow  Continue Lexapro 5 mg daily, for mood and anxiety  Discontinue CIWA protocol      Continue with group therapy, milieu therapy and occupational therapy  Continue frequent safety checks and vitals per unit protocol  Case discussed with treatment team   Continue with SLIM medical management as indicated  Continue coordinating with case management regarding disposition  Risks, benefits and possible side effects of Medications: Risks, benefits, and possible side effects of medications have been explained to the patient, who verbalizes understanding    Legal Status: 201  ------------------------------------------------------------    Subjective: Per nursing report, Ana De Luna has been \"pleasant and cooperative  About the unit  Attending groups and interacting with peers appropriately  Denies SI  Reports depression  Denies alcohol w/d symptoms  \"    Today, patient describes his mood as \"good\" and reports \"good\" appetite and energy level  Patient does report feeling tired and reports decreased sleep overnight  Patient reports being anxious and and having worrying thoughts  Patient states that he has been having thoughts of wanting to contact his ex-wife so he has been distracting himself to avoid these thoughts  Patient denies any medication side effects related to Lexapro    Patient states that he has " "attended groups today and intends to attend some groups today as well  Spoke to patient about starting naltrexone for alcohol use disorder, to which she was amenable  We will spoke to patient about going to rehab once discharged from the hospital to which patient was also amenable  Patient denies any current suicidal ideation, homicidal ideation, auditory visual hallucinations  PRNs overnight: None  VS: Reviewed, within normal limits    Progress Toward Goals: Gradual improvement    Psychiatric Review of Systems:  Behavior over the last 24 hours: improved  Sleep: difficulty falling asleep  Appetite: adequate  Medication side effects: none verbalized  ROS: Complete review of systems is negative except as noted above      Vital signs in last 24 hours:  Temp:  [97 4 °F (36 3 °C)-98 2 °F (36 8 °C)] 97 4 °F (36 3 °C)  HR:  [83-98] 85  Resp:  [16-18] 18  BP: (136-149)/(68-84) 141/83    Mental Status Exam:  Appearance:  casually dressed, dressed appropriately, adequate grooming   Behavior:  pleasant, cooperative, calm   Speech:  normal rate and volume   Mood:  \"Good\"   Affect:  slightly less constricted   Thought Process:  organized, coherent, goal directed, linear   Associations: intact associations   Thought Content:  no overt delusions   Perceptual Disturbances: Denies auditory or visual hallucinations and Does not appear to be responding to internal stimuli   Risk Potential: Suicidal ideation - None at present  Homicidal ideation - None  Potential for aggression - No   Sensorium:  oriented to person, place and time/date   Memory:  recent and remote memory grossly intact   Consciousness:  alert and awake   Attention/Concentration: attention span and concentration are age appropriate   Insight:  limited   Judgment: limited   Gait/Station: normal gait/station   Motor Activity: no abnormal movements     Current Medications:  Current Facility-Administered Medications   Medication Dose Route Frequency Provider Last " Rate   • acetaminophen  650 mg Oral Q6H PRN Ab Villalpando MD     • acetaminophen  650 mg Oral Q4H PRN Ab Villalpando MD     • acetaminophen  975 mg Oral Q6H PRN Ab Villalpando MD     • aluminum-magnesium hydroxide-simethicone  30 mL Oral Q4H PRN Ab Villaplando MD     • benztropine  1 mg Intramuscular Q4H PRN Max 6/day Ab Villalpando MD     • benztropine  1 mg Oral Q4H PRN Max 6/day Ab Villalpando MD     • hydrOXYzine HCL  50 mg Oral Q6H PRN Max 4/day Ab Villalpando MD      Or   • diphenhydrAMINE  50 mg Intramuscular Q6H PRN Ab Villalpando MD     • ergocalciferol  50,000 Units Oral Weekly Leora Gold PA-C     • escitalopram  5 mg Oral Daily Colt Hickman MD     • folic acid  1 mg Oral Daily Ab Villalpando MD     • glycerin-hypromellose-  1 drop Both Eyes Q3H PRN Ab Villalpando MD     • hydrochlorothiazide  12 5 mg Oral Daily Ab Villalpando MD     • hydrOXYzine HCL  100 mg Oral Q6H PRN Max 4/day Ab Villalpando MD      Or   • LORazepam  2 mg Intramuscular Q6H PRN Ab Villalpando MD     • hydrOXYzine HCL  25 mg Oral Q6H PRN Max 4/day Ab Villalpando MD     • LORazepam  1 mg Oral Q4H PRN Max 6/day Ashley Mcdaniels MD     • multivitamin-minerals  1 tablet Oral Daily Ab Villalpando MD     • naltrexone  25 mg Oral Daily Colt Hickman MD     • OLANZapine  5 mg Oral Q4H PRN Max 3/day Ab Villalpando MD      Or   • OLANZapine  2 5 mg Intramuscular Q4H PRN Max 3/day Ab Villalpando MD     • OLANZapine  5 mg Oral Q3H PRN Max 3/day Ab Villalpando MD      Or   • OLANZapine  5 mg Intramuscular Q3H PRN Max 3/day Ab Villalpando MD     • OLANZapine  2 5 mg Oral Q4H PRN Max 6/day Ab Villalpando MD     • polyethylene glycol  17 g Oral Daily PRN Ab Villalpando MD     • propranolol  10 mg Oral Q8H PRN Ab Villalpando MD     • senna-docusate sodium  1 tablet Oral Daily PRN Lorenzo Lozoya MD     • thiamine  100 mg Oral Daily Lorenzo Lozoya MD     • traZODone  50 mg Oral HS PRN Lorenzo Lozoya MD         Behavioral Health Medications: all current active meds have been reviewed  Changes as in plan section above  Laboratory results:  I have personally reviewed all pertinent laboratory/tests results    Recent Results (from the past 48 hour(s))   TSH, 3rd generation with Free T4 reflex    Collection Time: 03/21/23  5:15 AM   Result Value Ref Range    TSH 3RD GENERATON 3 727 0 450 - 4 500 uIU/mL   Lipid panel    Collection Time: 03/21/23  5:15 AM   Result Value Ref Range    Cholesterol 241 (H) See Comment mg/dL    Triglycerides 164 (H) See Comment mg/dL    HDL, Direct 39 (L) >=40 mg/dL    LDL Calculated 169 (H) 0 - 100 mg/dL    Non-HDL-Chol (CHOL-HDL) 202 mg/dl   Vitamin B12    Collection Time: 03/21/23  5:15 AM   Result Value Ref Range    Vitamin B-12 370 100 - 900 pg/mL   Folate    Collection Time: 03/21/23  5:15 AM   Result Value Ref Range    Folate 16 8 3 1 - 17 5 ng/mL   Vitamin D 25 hydroxy    Collection Time: 03/21/23  5:15 AM   Result Value Ref Range    Vit D, 25-Hydroxy 10 2 (L) 30 0 - 100 0 ng/mL   Total Syphilis (IgG/IgM) Screening    Collection Time: 03/21/23  5:15 AM   Result Value Ref Range    SYPHILIS TOTAL ANTIBODY Non-reactive Non-Reactive   Comprehensive metabolic panel    Collection Time: 03/21/23  5:15 AM   Result Value Ref Range    Sodium 137 135 - 147 mmol/L    Potassium 3 9 3 5 - 5 3 mmol/L    Chloride 97 96 - 108 mmol/L    CO2 29 21 - 32 mmol/L    ANION GAP 11 4 - 13 mmol/L    BUN 10 5 - 25 mg/dL    Creatinine 0 95 0 60 - 1 30 mg/dL    Glucose 109 65 - 140 mg/dL    Glucose, Fasting 109 (H) 65 - 99 mg/dL    Calcium 10 0 8 4 - 10 2 mg/dL    AST 26 13 - 39 U/L    ALT 31 7 - 52 U/L    Alkaline Phosphatase 37 34 - 104 U/L    Total Protein 7 3 6 4 - 8 4 g/dL    Albumin 4 2 3 5 - 5 0 g/dL    Total Bilirubin 0 76 0 20 - 1 00 mg/dL    eGFR 101 ml/min/1 73sq m   CBC and differential    Collection Time: 03/21/23  5:15 AM   Result Value Ref Range    WBC 7 36 4 31 - 10 16 Thousand/uL    RBC 5 36 3 88 - 5 62 Million/uL    Hemoglobin 14 6 12 0 - 17 0 g/dL    Hematocrit 45 0 36 5 - 49 3 %    MCV 84 82 - 98 fL    MCH 27 2 26 8 - 34 3 pg    MCHC 32 4 31 4 - 37 4 g/dL    RDW 13 2 11 6 - 15 1 %    MPV 10 1 8 9 - 12 7 fL    Platelets 761 028 - 156 Thousands/uL    nRBC 0 /100 WBCs    Neutrophils Relative 40 (L) 43 - 75 %    Immat GRANS % 0 0 - 2 %    Lymphocytes Relative 51 (H) 14 - 44 %    Monocytes Relative 6 4 - 12 %    Eosinophils Relative 2 0 - 6 %    Basophils Relative 1 0 - 1 %    Neutrophils Absolute 2 94 1 85 - 7 62 Thousands/µL    Immature Grans Absolute 0 02 0 00 - 0 20 Thousand/uL    Lymphocytes Absolute 3 74 0 60 - 4 47 Thousands/µL    Monocytes Absolute 0 43 0 17 - 1 22 Thousand/µL    Eosinophils Absolute 0 18 0 00 - 0 61 Thousand/µL    Basophils Absolute 0 05 0 00 - 0 10 Thousands/µL   ECG 12 lead    Collection Time: 03/21/23  9:19 AM   Result Value Ref Range    Ventricular Rate 86 BPM    Atrial Rate 86 BPM    PA Interval 160 ms    QRSD Interval 96 ms    QT Interval 368 ms    QTC Interval 440 ms    P [place] 42 degrees    QRS Axis 25 degrees    T Wave Axis 64 degrees        This note has been constructed using a voice recognition system  There may be translation, syntax, or grammatical errors  If you have any questions, please contact the dictating author      Acosta Ramos MD PGY1

## 2023-03-22 NOTE — PLAN OF CARE
JOHNATHON contacted [facility] to request an assessment to see if pt would qualify for funding for inpatient rehab  CM faxed clinical to [facility] at [phone]  [facility] will complete assessment with pt via telephone and contact CM with determination of qualification for funding

## 2023-03-22 NOTE — NURSING NOTE
Patient visible on unit, isolative to self Patient reported  5/10 depression, 2/5 anxiety  Patient denies Si,HI  Patient reported future plans that he is looking forward to carry out  Patient denies any unmeet needs at this time   Patient remain Q 7 min check

## 2023-03-22 NOTE — PROGRESS NOTES
03/22/23 0819   Team Meeting   Meeting Type Daily Rounds   Team Members Present   Team Members Present Physician;Nurse;   Physician Team Member Remigio [id number] Team Member Maritzakp   [facility] Management Team Member Samy   Patient/Family Present   Patient Present No   Patient's Family Present No   Pt is calm, pleasant, cooperative  Remains on CIWAs which have been 0  Pt with good insight into illness  Remains in good behavioral control  Pt reports interest in rehab following d/c  Med/meal compliant  Discharge to be determined

## 2023-03-22 NOTE — PROGRESS NOTES
03/22/23 1050   Team Meeting   Meeting Type Tx Team Meeting   Team Members Present   Team Members Present Physician;Nurse;   Physician Team Member Remigio [id number] Team Member [address] Management Team Member [place]   Patient/Family Present   Patient Present Yes   Patient's Family Present No     Pt seen for tx team meeting  Pt educated on med management, case management and group therapy  Pt appears with depressed affect, but pleasant and cooperative  Tx plan reviewed and signed

## 2023-03-22 NOTE — NURSING NOTE
"The pt was calm cooperative and pleasant when this writer assessed him  The pt denied pain, anxiety/depression,SI/HI  The pt only complaint was he felt very tired  The pt stated 'I don't know why I'm so tired, I was falling asleep in the TV room\"  The pt informed this writer he would nap for approx 1 hr so he doesn't interrupt his sleep tonight  Prior to the pt going to his rm to rest he also notified this writer he was waiting to get an update from his  regarding a program he may be admitted to, he said he was looking forward in receiving more information  The pt's appetite is good    "

## 2023-03-23 RX ORDER — NALTREXONE HYDROCHLORIDE 50 MG/1
50 TABLET, FILM COATED ORAL DAILY
Status: DISCONTINUED | OUTPATIENT
Start: 2023-03-24 | End: 2023-03-30 | Stop reason: HOSPADM

## 2023-03-23 RX ADMIN — MULTIPLE VITAMINS W/ MINERALS TAB 1 TABLET: TAB ORAL at 08:10

## 2023-03-23 RX ADMIN — ESCITALOPRAM OXALATE 5 MG: 5 TABLET, FILM COATED ORAL at 08:10

## 2023-03-23 RX ADMIN — ACETAMINOPHEN 975 MG: 325 TABLET ORAL at 07:54

## 2023-03-23 RX ADMIN — THIAMINE HCL TAB 100 MG 100 MG: 100 TAB at 08:10

## 2023-03-23 RX ADMIN — FOLIC ACID 1 MG: 1 TABLET ORAL at 08:10

## 2023-03-23 RX ADMIN — HYDROCHLOROTHIAZIDE 12.5 MG: 25 TABLET ORAL at 08:10

## 2023-03-23 RX ADMIN — NALTREXONE HYDROCHLORIDE 25 MG: 50 TABLET, FILM COATED ORAL at 08:10

## 2023-03-23 NOTE — NURSING NOTE
Patient hs been visible in the milieu  Interacting with peers  Appropriate  Pleasant and cooperative  Denies SI but continues to report depression  States that he didn't sleep well last and believes it is from the bed  Compliant with medications  Will monitor

## 2023-03-23 NOTE — PLAN OF CARE
Pt qualified for [facility] funding through [facility]  CM to update [facility] when dc date is known so bed search can begin

## 2023-03-23 NOTE — PROGRESS NOTES
03/23/23 1100   Activity/Group Checklist   Group   (recovery group)   Attendance Attended   Attendance Duration (min) 46-60   Interactions Interacted appropriately   Affect/Mood Appropriate   Goals Achieved Discussed coping strategies; Discussed self-esteem issues; Able to listen to others; Able to engage in interactions; Able to reflect/comment on own behavior;Able to manage/cope with feelings; Able to self-disclose; Able to recieve feedback; Identified distorted thoughts/beliefs

## 2023-03-23 NOTE — NURSING NOTE
"Pt observed in the milieu  Quiet and isolative to self  Pt approachable and pleasant  Pt appears sad  +depression 10/10  Stats \"I just feel really tired but all I've doing in sleeping  I just want to get the help I need and get better\"  Prn offered/declined  Pt denied SI/HI/AVH  Continued q7m checks for safety    "

## 2023-03-23 NOTE — PLAN OF CARE
Problem: DISCHARGE PLANNING  Goal: Discharge to home or other facility with appropriate resources  Description: INTERVENTIONS:  - Identify barriers to discharge w/patient and caregiver  - Arrange for needed discharge resources and transportation as appropriate  - Identify discharge learning needs (meds, wound care, etc )  - Arrange for interpretive services to assist at discharge as needed  - Refer to Case Management Department for coordinating discharge planning if the patient needs post-hospital services based on physician/advanced practitioner order or complex needs related to functional status, cognitive ability, or social support system  Outcome: Progressing     Problem: SELF HARM/SUICIDALITY  Goal: Will have no self-injury during hospital stay  Description: INTERVENTIONS:  - Q 15 MINUTES: Routine safety checks  - Q WAKING SHIFT & PRN: Assess risk to determine if routine checks are adequate to maintain patient safety  - Encourage patient to participate actively in care by formulating a plan to combat response to suicidal ideation, identify supports and resources  Outcome: Progressing     Problem: DEPRESSION  Goal: Will be euthymic at discharge  Description: INTERVENTIONS:  - Administer medication as ordered  - Provide emotional support via 1:1 interaction with staff  - Encourage involvement in milieu/groups/activities  - Monitor for social isolation  Outcome: Progressing     Problem: ANXIETY  Goal: Will report anxiety at manageable levels  Description: INTERVENTIONS:  - Administer medication as ordered  - Teach and encourage coping skills  - Provide emotional support  - Assess patient/family for anxiety and ability to cope  Outcome: Progressing  Goal: By discharge: Patient will verbalize 2 strategies to deal with anxiety  Description: Interventions:  - Identify any obvious source/trigger to anxiety  - Staff will assist patient in applying identified coping technique/skills  - Encourage attendance of scheduled groups and activities  Outcome: Progressing     Problem: SUBSTANCE USE/ABUSE  Goal: Will have no detox symptoms and will verbalize plan for changing substance-related behavior  Description: INTERVENTIONS:  - Monitor physical status and assess for symptoms of withdrawal  - Administer medication as ordered  - Provide emotional support with 1 on 1 interaction with staff  - Encourage recovery focused program/ addiction education  - Assess for verbalization of changing behaviors related to substance abuse  - Initiate consults and referrals as appropriate (Case Management, Spiritual Care, etc )  Outcome: Progressing  Goal: By discharge, will develop insight into their chemical dependency and sustain motivation to continue in recovery  Description: INTERVENTIONS:  - Attends all daily group sessions and scheduled AA groups  - Actively practices coping skills through participation in the therapeutic community and adherence to program rules  - Reviews and completes assignments from individual treatment plan  - Assist patient development of understanding of their personal cycle of addiction and relapse triggers  Outcome: Progressing  Goal: By discharge, patient will have ongoing treatment plan addressing chemical dependency  Description: INTERVENTIONS:  - Assist patient with resources and/or appointments for ongoing recovery based living  Outcome: Progressing     Problem: Ineffective Coping  Goal: Participates in unit activities  Description: Interventions:  - Provide therapeutic environment   - Provide required programming   - Redirect inappropriate behaviors   Outcome: Progressing

## 2023-03-23 NOTE — PROGRESS NOTES
03/23/23 0802   Team Meeting   Meeting Type Daily Rounds   Team Members Present   Team Members Present Physician;Nurse;   Physician Team Member [facility]   [facility]   Care Management Team Member Samy   Patient/Family Present   Patient Present No   Patient's Family Present No   Pt reporting poor sleep last night  Requesting PRN medication for neck and back pain d/t bed  Pt started on naltrexone yesterday  Bed search to begin next week

## 2023-03-23 NOTE — NURSING NOTE
Received Patient at 1500  Patient visible on unit isolativeto self  Patient Denies Si,HI  Patient reported that his depression decrease  Patient received education on his Revia  Patient remain Q 7 min check

## 2023-03-23 NOTE — PROGRESS NOTES
"Progress Note - Shaina [age] y o  male MRN: [id number]  Unit/Bed#: U 340-01 Encounter: [id number]    Assessment/Plan   Principal Problem:    Major depressive disorder, recurrent, unspecified ([address] )  Active Problems:    Elevated blood pressure reading    Medical clearance for psychiatric admission    Vitamin D deficiency    Elevated lipids    Class 3 severe obesity due to excess calories without serious comorbidity with body mass index (BMI) of 45 0 to 49 9 in adult [facility])      Recommended Treatment:   Increase naltrexone from 25 mg daily to 50 mg daily, starting tomorrow (3/24/2023), for alcohol use disorder  Continue Lexapro 5 mg daily, for mood and anxiety    Continue with group therapy, milieu therapy and occupational therapy  Continue frequent safety checks and vitals per unit protocol  Case discussed with treatment team   Continue with SLIM medical management as indicated  Continue coordinating with case management regarding disposition  Risks, benefits and possible side effects of Medications: Risks, benefits, and possible side effects of medications have been explained to the patient, who verbalizes understanding    Legal Status: 201  ------------------------------------------------------------    Subjective: Per nursing report, Guerrero Cartwright has been \"visible in the milieu  Interacting with peers  Appropriate  Pleasant and cooperative  Denies SI but continues to report depression  States that he didn't sleep well last and believes it is from the bed  Compliant with medications  \"     Today, patient describes his mood as \"not sad, trying to be more optimistic  \"  Patient reports decreased sleep overnight, with multiple awakenings, which he attributes to the bed in his room  He says that he took as needed trazodone but that it was ineffective    Patient states that he has been attending groups, including pharmacy group and that he has been reading and sleeping to keep his mind " "occupied  Patient states he is nervous about having an upcoming conversation with his wife about how she feels concerning everything has transpired  Currently patient denies any alcohol cravings state that he has not had any cravings during admission, even prior to starting the naltrexone  Patient does not report any medication side effects related to naltrexone or Lexapro  Patient is informed that we would like to increase the naltrexone to 50 mg starting tomorrow  Patient was amenable to this plan  He was also informed that once stabilized on the medications we would plan for likely discharge to rehab sometime next week  Patient denies any current suicidal ideation, homicidal ideation, auditory visual hallucinations  PRNs overnight: Trazodone  VS: Reviewed, within normal limits    Progress Toward Goals: Gradual improvement    Psychiatric Review of Systems:  Behavior over the last 24 hours: unchanged  Sleep: frequent awakenings and difficulty falling asleep  Appetite: adequate  Medication side effects: none verbalized  ROS: Complete review of systems is negative except as noted above      Vital signs in last 24 hours:  Temp:  [97 3 °F (36 3 °C)-97 9 °F (36 6 °C)] 97 3 °F (36 3 °C)  HR:  [83-86] 86  Resp:  [16-18] 16  BP: (128-145)/(62-78) 128/62    Mental Status Exam:  Appearance:  casually dressed, dressed appropriately, adequate grooming   Behavior:  pleasant, cooperative, calm   Speech:  normal rate and volume   Mood:  \"not sad, trying to be more optimistic\"   Affect:  slightly constricted   Thought Process:  organized, logical, coherent, goal directed, linear   Associations: intact associations   Thought Content:  no overt delusions   Perceptual Disturbances: Denies auditory or visual hallucinations and Does not appear to be responding to internal stimuli   Risk Potential: Suicidal ideation - None at present  Homicidal ideation - None at present  Potential for aggression - Not at present   Sensorium: " oriented to person, place and time/date   Memory:  recent and remote memory grossly intact   Consciousness:  alert and awake   Attention/Concentration: attention span and concentration are age appropriate   Insight:  limited   Judgment: limited   Gait/Station: normal gait/station   Motor Activity: no abnormal movements     Current Medications:  Current Facility-Administered Medications   Medication Dose Route Frequency Provider Last Rate   • acetaminophen  650 mg Oral Q6H PRN Jeff Rajan MD     • acetaminophen  650 mg Oral Q4H PRN Jeff Rajan MD     • acetaminophen  975 mg Oral Q6H PRN Jeff Rajan MD     • aluminum-magnesium hydroxide-simethicone  30 mL Oral Q4H PRN Jeff Rajan MD     • benztropine  1 mg Intramuscular Q4H PRN Max 6/day Jeff Rajan MD     • benztropine  1 mg Oral Q4H PRN Max 6/day Jeff Rajan MD     • hydrOXYzine HCL  50 mg Oral Q6H PRN Max 4/day Jeff Rajan MD      Or   • diphenhydrAMINE  50 mg Intramuscular Q6H PRN Jeff Rajan MD     • ergocalciferol  50,000 Units Oral Weekly Rakan Luna PA-C     • escitalopram  5 mg Oral Daily Zachariah Prabhakar MD     • folic acid  1 mg Oral Daily Jeff Rajan MD     • glycerin-hypromellose-  1 drop Both Eyes Q3H PRN Jeff Rajan MD     • hydrochlorothiazide  12 5 mg Oral Daily Jeff Rajan MD     • hydrOXYzine HCL  100 mg Oral Q6H PRN Max 4/day Jeff Rajan MD      Or   • LORazepam  2 mg Intramuscular Q6H PRN Jeff Rajan MD     • hydrOXYzine HCL  25 mg Oral Q6H PRN Max 4/day Jeff Rajan MD     • LORazepam  1 mg Oral Q4H PRN Max 6/day Maninder Elkins MD     • multivitamin-minerals  1 tablet Oral Daily Jeff Rajan MD     • [START ON 3/24/2023] naltrexone  50 mg Oral Daily Zachariah Prabhakar MD     • OLANZapine  5 mg Oral Q4H PRN Max 3/day Jeff Rajan MD      Or   • OLANZapine  2 5 mg Intramuscular Q4H PRN Max 3/day Jeff Rajan MD     • OLANZapine  5 mg Oral Q3H PRN Max 3/day Jeff Rajan MD      Or   • OLANZapine  5 mg Intramuscular Q3H PRN Max 3/day Jeff Rajan MD     • OLANZapine  2 5 mg Oral Q4H PRN Max 6/day Jeff Rajan MD     • polyethylene glycol  17 g Oral Daily PRN eJff Rajan MD     • propranolol  10 mg Oral Q8H PRN Jeff Rajan MD     • senna-docusate sodium  1 tablet Oral Daily PRN Jeff Rajan MD     • thiamine  100 mg Oral Daily Jeff Rajan MD     • traZODone  50 mg Oral HS PRN Jeff Rajan MD         Behavioral Health Medications: all current active meds have been reviewed  Changes as in plan section above  Laboratory results:  I have personally reviewed all pertinent laboratory/tests results  No results found for this or any previous visit (from the past 48 hour(s))  This note has been constructed using a voice recognition system  There may be translation, syntax, or grammatical errors  If you have any questions, please contact the dictating author      Zachariah Prabhakar MD PGY1

## 2023-03-24 RX ORDER — ESCITALOPRAM OXALATE 10 MG/1
10 TABLET ORAL DAILY
Status: DISCONTINUED | OUTPATIENT
Start: 2023-03-25 | End: 2023-03-30 | Stop reason: HOSPADM

## 2023-03-24 RX ADMIN — ACETAMINOPHEN 975 MG: 325 TABLET ORAL at 21:02

## 2023-03-24 RX ADMIN — FOLIC ACID 1 MG: 1 TABLET ORAL at 09:29

## 2023-03-24 RX ADMIN — ESCITALOPRAM OXALATE 5 MG: 5 TABLET, FILM COATED ORAL at 09:29

## 2023-03-24 RX ADMIN — NALTREXONE HYDROCHLORIDE 50 MG: 50 TABLET, FILM COATED ORAL at 09:29

## 2023-03-24 RX ADMIN — THIAMINE HCL TAB 100 MG 100 MG: 100 TAB at 09:29

## 2023-03-24 RX ADMIN — MULTIPLE VITAMINS W/ MINERALS TAB 1 TABLET: TAB ORAL at 09:29

## 2023-03-24 RX ADMIN — TRAZODONE HYDROCHLORIDE 50 MG: 50 TABLET ORAL at 21:02

## 2023-03-24 RX ADMIN — ACETAMINOPHEN 975 MG: 325 TABLET ORAL at 11:12

## 2023-03-24 RX ADMIN — HYDROCHLOROTHIAZIDE 12.5 MG: 25 TABLET ORAL at 09:29

## 2023-03-24 NOTE — PROGRESS NOTES
03/24/23 0850   Team Meeting   Meeting Type Daily Rounds   Team Members Present   Team Members Present Physician;Nurse;   Physician Team Member [id number] [facility] Samy   Patient/Family Present   Patient Present No   Patient's Family Present No   Pt pleasant, calm, cooperative  Pt reading to help initiate sleep, offered PRN Trazodone and declined  Pt reporting difficulty sleeping d/t environment  Depakote was increased  Bed search to begin next week

## 2023-03-24 NOTE — NURSING NOTE
Patient has been seclusive to his room as of this time  Only out for breakfast  C/o feeling tired and said that he didn't sleep well last night due to the beds being uncomfortable and his mind racing  Denies SI, continues to report depression  Very pleasant and cooperative  Compliant with all morning medications as well as the increased dose of Naltrexone

## 2023-03-24 NOTE — PROGRESS NOTES
Impromptu group discussion  Patient shared of his addiction and the feeling that he will never be able to change  He has so much guilt and shame that gets in the way  He believes he is not good enough and this came from his addictive family system  Patient has the tendency to self blame him for his life  Requested patient write about his unmanageablity in his life and his powerlessness including the emotions he runs away from feeling

## 2023-03-24 NOTE — NURSING NOTE
Patient denies AVH/SI/HI  Helped patient and observed patient shave head and beard  Returned all belongings to his personal belongings bag

## 2023-03-24 NOTE — PROGRESS NOTES
"Progress Note - Shaina [age] y o  male MRN: [id number]  Unit/Bed#: [facility] 340-01 Encounter: [id number]    Assessment/Plan   Principal Problem:    Major depressive disorder, recurrent, unspecified ([address] )  Active Problems:    Elevated blood pressure reading    Medical clearance for psychiatric admission    Vitamin D deficiency    Elevated lipids    Class 3 severe obesity due to excess calories without serious comorbidity with body mass index (BMI) of 45 0 to 49 9 in [facility])      Recommended Treatment:   Increase Lexapro to 10 mg daily, starting tomorrow, for depression  Continue naltrexone for 50 mg daily, for alcohol use disorder    Continue with group therapy, milieu therapy and occupational therapy  Continue frequent safety checks and vitals per unit protocol  Case discussed with treatment team   Continue with SLIM medical management as indicated  Continue coordinating with case management regarding disposition  Risks, benefits and possible side effects of Medications: Risks, benefits, and possible side effects of medications have been explained to the patient, who verbalizes understanding    Legal Status: 201  ------------------------------------------------------------    Subjective: Per nursing report, Michi Whaley has been \"visible on unit isolativeto (sic) self  Patient Denies Si,HI  Patient reported that his depression decrease  Patient received education on his Revia  \"    Today, patient describes his mood as \"numb\" which he attributes to multiple worrying thoughts, including having an upcoming, likely emotional, conversation with his wife  Patient reports decreased sleep overnight due to worries and states that while he is still depressed he is trying to keep his mind occupied  Patient reports feeling tired with decreased energy during the day reports adequate appetite    Patient is informed that we would like to increase his Lexapro starting tomorrow, to which he was " "amenable  Patient denies any current suicidal ideation, homicidal ideation, auditory or visual hallucinations  PRNs overnight: Tylenol  VS: Reviewed, within normal limits    Progress Toward Goals: slow improvement    Psychiatric Review of Systems:  Behavior over the last 24 hours: unchanged  Sleep: insomnia  Appetite: adequate  Medication side effects: none verbalized  ROS: Complete review of systems is negative except as noted above      Vital signs in last 24 hours:  Temp:  [96 4 °F (35 8 °C)-97 4 °F (36 3 °C)] 96 4 °F (35 8 °C)  HR:  [78] 78  Resp:  [16] 16  BP: (140-144)/(77-84) 140/84    Mental Status Exam:  Appearance:  casually dressed, dressed appropriately, adequate grooming   Behavior:  pleasant, cooperative, calm   Speech:  normal rate and volume   Mood:  \"Numb\"   Affect:  constricted   Thought Process:  organized, logical, coherent, goal directed, linear   Associations: intact associations   Thought Content:  no overt delusions   Perceptual Disturbances: Denies auditory or visual hallucinations and Does not appear to be responding to internal stimuli   Risk Potential: Suicidal ideation - None at present  Homicidal ideation - None at present  Potential for aggression - Not at present   Sensorium:  oriented to person, place and time/date   Memory:  recent and remote memory grossly intact   Consciousness:  alert and awake   Attention/Concentration: attention span and concentration are age appropriate   Insight:  limited   Judgment: limited   Gait/Station: normal gait/station   Motor Activity: no abnormal movements     Current Medications:  Current Facility-Administered Medications   Medication Dose Route Frequency Provider Last Rate   • acetaminophen  650 mg Oral Q6H PRN Sabas Hassan MD     • acetaminophen  650 mg Oral Q4H PRN Sabas Hassan MD     • acetaminophen  975 mg Oral Q6H PRN Sabas Hassan MD     • aluminum-magnesium hydroxide-simethicone  30 mL Oral Q4H PRN Phyllistine Precious " Laney Barfield MD     • benztropine  1 mg Intramuscular Q4H PRN Max 6/day Roshni Lobato MD     • benztropine  1 mg Oral Q4H PRN Max 6/day Roshni Lobato MD     • hydrOXYzine HCL  50 mg Oral Q6H PRN Max 4/day Roshni Lobato MD      Or   • diphenhydrAMINE  50 mg Intramuscular Q6H PRN Roshni Lobato MD     • ergocalciferol  50,000 Units Oral Weekly Venice Ceballos PA-C     • [START ON 3/25/2023] escitalopram  10 mg Oral Daily Delfin Parsons MD     • folic acid  1 mg Oral Daily Roshni Lobato MD     • glycerin-hypromellose-  1 drop Both Eyes Q3H PRN Roshni Lobato MD     • hydrochlorothiazide  12 5 mg Oral Daily Roshni Lobato MD     • hydrOXYzine HCL  100 mg Oral Q6H PRN Max 4/day Roshni Lobato MD      Or   • LORazepam  2 mg Intramuscular Q6H PRN Roshni Lobato MD     • hydrOXYzine HCL  25 mg Oral Q6H PRN Max 4/day Roshni Lobato MD     • LORazepam  1 mg Oral Q4H PRN Max 6/day Yan Guan MD     • multivitamin-minerals  1 tablet Oral Daily Roshni Lobato MD     • naltrexone  50 mg Oral Daily Delfin Parsons MD     • OLANZapine  5 mg Oral Q4H PRN Max 3/day Roshni Lobato MD      Or   • OLANZapine  2 5 mg Intramuscular Q4H PRN Max 3/day Roshni Lobato MD     • OLANZapine  5 mg Oral Q3H PRN Max 3/day Roshni Lobato MD      Or   • OLANZapine  5 mg Intramuscular Q3H PRN Max 3/day Roshni Lobato MD     • OLANZapine  2 5 mg Oral Q4H PRN Max 6/day Roshni Lobato MD     • polyethylene glycol  17 g Oral Daily PRN Roshni Lobato MD     • propranolol  10 mg Oral Q8H PRN Roshni Lobato MD     • senna-docusate sodium  1 tablet Oral Daily PRN Roshni Lobato MD     • thiamine  100 mg Oral Daily Roshni Lobato MD     • traZODone  50 mg Oral HS PRN Roshni Lobato MD         Behavioral Health Medications: all current active meds have been reviewed   Changes as in plan section above  Laboratory results:  I have personally reviewed all pertinent laboratory/tests results  No results found for this or any previous visit (from the past 48 hour(s))  This note has been constructed using a voice recognition system  There may be translation, syntax, or grammatical errors  If you have any questions, please contact the dictating author      Mathew Byers MD PGY1

## 2023-03-24 NOTE — NURSING NOTE
Patient is sitting in patient lounge reading a book and hoping it will help him get off to sleep  He is aware he has available to him a prn Trazadone if he wishes to use it

## 2023-03-25 RX ORDER — METHOCARBAMOL 500 MG/1
500 TABLET, FILM COATED ORAL EVERY 8 HOURS SCHEDULED
Status: DISCONTINUED | OUTPATIENT
Start: 2023-03-25 | End: 2023-03-27

## 2023-03-25 RX ADMIN — ESCITALOPRAM OXALATE 10 MG: 10 TABLET ORAL at 08:31

## 2023-03-25 RX ADMIN — NALTREXONE HYDROCHLORIDE 50 MG: 50 TABLET, FILM COATED ORAL at 08:31

## 2023-03-25 RX ADMIN — METHOCARBAMOL TABLETS 500 MG: 500 TABLET, COATED ORAL at 21:05

## 2023-03-25 RX ADMIN — FOLIC ACID 1 MG: 1 TABLET ORAL at 08:31

## 2023-03-25 RX ADMIN — ACETAMINOPHEN 975 MG: 325 TABLET ORAL at 16:02

## 2023-03-25 RX ADMIN — HYDROCHLOROTHIAZIDE 12.5 MG: 25 TABLET ORAL at 08:31

## 2023-03-25 RX ADMIN — THIAMINE HCL TAB 100 MG 100 MG: 100 TAB at 08:31

## 2023-03-25 RX ADMIN — MULTIPLE VITAMINS W/ MINERALS TAB 1 TABLET: TAB ORAL at 08:31

## 2023-03-25 NOTE — NURSING NOTE
Patient is sitting in tv room with peers watching tv, pleasant upon approach  Patient denies all psych symptoms at this time  Appropriate when conversing with this RN  Medication compliant and calm and cooperate  Will continue to monitor for safety

## 2023-03-25 NOTE — PROGRESS NOTES
Progress Note - Behavioral Health   Yuniel Marley 45 y o  male MRN: [id number]  Unit/Bed#: [facility] 340-01 Encounter: [id number]    Assessment/Plan   Principal Problem:    Major depressive disorder, recurrent, unspecified ([facility] [address] )  Active Problems:    Elevated blood pressure reading    Medical clearance for psychiatric admission    Vitamin D deficiency    Elevated lipids    Class 3 severe obesity due to excess calories without serious comorbidity with body mass index (BMI) of 45 0 to 49 9 in [facility])    Recommended Treatment:     All current active medications have been reviewed  Encourage group therapy, milieu therapy and occupational therapy  Behavioral Health checks every 7 minutes  Requires continued inpatient treatment due to chronic illness and high risk of decompensation if discharged before long term stability is achieved  Requires continued inpatient treatment while awaiting placement due to chronic illness and high risk of decompensation if discharged to an unstructured environment  Medical management per SLIM  Legal Status: 201  ----------------------------------------      Subjective: Patient was seen and examined today at the bedside  Patient is calm and cooperative and pleasant upon approach  He is taking his medication as prescribed  He denies any suicidal ideations and reports improvement of his depression  He denies alcohol craving      Behavior over the last 24 hours:  improved  Sleep: hypersomnia  Appetite: normal  Medication side effects: No  ROS: muscle pain    Mental Status Evaluation:  Appearance:  age appropriate, casually dressed   Behavior:  pleasant, cooperative   Speech:  normal rate and volume   Mood:  anxious   Affect:  constricted   Thought Process:  organized, logical   Associations: intact associations   Thought Content:  normal, no overt delusions   Perceptual Disturbances: denies auditory hallucinations when asked   Risk Potential: Suicidal ideation - None at present  Homicidal ideation - None at present  Potential for aggression - No   Sensorium:  oriented to person, place and time/date   Memory:  recent and remote memory grossly intact   Consciousness:  alert and awake   Attention/Concentration: attention span and concentration are age appropriate   Insight:  improving   Judgment: improving   Gait/Station: normal gait/station   Motor Activity: no abnormal movements     Medications:   all current active meds have been reviewed, continue current psychiatric medications and current meds:   Current Facility-Administered Medications   Medication Dose Route Frequency   • acetaminophen (TYLENOL) tablet 650 mg  650 mg Oral Q6H PRN   • acetaminophen (TYLENOL) tablet 650 mg  650 mg Oral Q4H PRN   • acetaminophen (TYLENOL) tablet 975 mg  975 mg Oral Q6H PRN   • aluminum-magnesium hydroxide-simethicone (MYLANTA) oral suspension 30 mL  30 mL Oral Q4H PRN   • benztropine (COGENTIN) injection 1 mg  1 mg Intramuscular Q4H PRN Max 6/day   • benztropine (COGENTIN) tablet 1 mg  1 mg Oral Q4H PRN Max 6/day   • hydrOXYzine HCL (ATARAX) tablet 50 mg  50 mg Oral Q6H PRN Max 4/day    Or   • diphenhydrAMINE (BENADRYL) injection 50 mg  50 mg Intramuscular Q6H PRN   • ergocalciferol (VITAMIN D2) capsule 50,000 Units  50,000 Units Oral Weekly   • escitalopram (LEXAPRO) tablet 10 mg  10 mg Oral Daily   • folic acid (FOLVITE) tablet 1 mg  1 mg Oral Daily   • glycerin-hypromellose- (ARTIFICIAL TEARS) ophthalmic solution 1 drop  1 drop Both Eyes Q3H PRN   • hydrochlorothiazide (HYDRODIURIL) tablet 12 5 mg  12 5 mg Oral Daily   • hydrOXYzine HCL (ATARAX) tablet 100 mg  100 mg Oral Q6H PRN Max 4/day    Or   • LORazepam (ATIVAN) injection 2 mg  2 mg Intramuscular Q6H PRN   • hydrOXYzine HCL (ATARAX) tablet 25 mg  25 mg Oral Q6H PRN Max 4/day   • LORazepam (ATIVAN) tablet 1 mg  1 mg Oral Q4H PRN Max 6/day   • multivitamin-minerals (CENTRUM) tablet 1 tablet  1 tablet Oral Daily   • naltrexone (REVIA) tablet 50 mg  50 mg Oral Daily   • OLANZapine (ZyPREXA) tablet 5 mg  5 mg Oral Q4H PRN Max 3/day    Or   • OLANZapine (ZyPREXA) IM injection 2 5 mg  2 5 mg Intramuscular Q4H PRN Max 3/day   • OLANZapine (ZyPREXA) tablet 5 mg  5 mg Oral Q3H PRN Max 3/day    Or   • OLANZapine (ZyPREXA) IM injection 5 mg  5 mg Intramuscular Q3H PRN Max 3/day   • OLANZapine (ZyPREXA) tablet 2 5 mg  2 5 mg Oral Q4H PRN Max 6/day   • polyethylene glycol (MIRALAX) packet 17 g  17 g Oral Daily PRN   • propranolol (INDERAL) tablet 10 mg  10 mg Oral Q8H PRN   • senna-docusate sodium (SENOKOT S) 8 6-50 mg per tablet 1 tablet  1 tablet Oral Daily PRN   • thiamine tablet 100 mg  100 mg Oral Daily   • traZODone (DESYREL) tablet 50 mg  50 mg Oral HS PRN       Current Facility-Administered Medications   Medication Dose Route Frequency Provider Last Rate   • acetaminophen  650 mg Oral Q6H PRN Baylee Peterson MD     • acetaminophen  650 mg Oral Q4H PRN Baylee Peterson MD     • acetaminophen  975 mg Oral Q6H PRN Baylee Peterson MD     • aluminum-magnesium hydroxide-simethicone  30 mL Oral Q4H PRN Baylee Peterson MD     • benztropine  1 mg Intramuscular Q4H PRN Max 6/day Baylee Peterson MD     • benztropine  1 mg Oral Q4H PRN Max 6/day Baylee Peterson MD     • hydrOXYzine HCL  50 mg Oral Q6H PRN Max 4/day Baylee Peterson MD      Or   • diphenhydrAMINE  50 mg Intramuscular Q6H PRN Baylee Peterson MD     • ergocalciferol  50,000 Units Oral Weekly Camryn Vicente PA-C     • escitalopram  10 mg Oral Daily Ronald Rodriguez MD     • folic acid  1 mg Oral Daily Baylee Peterson MD     • glycerin-hypromellose-  1 drop Both Eyes Q3H PRN Baylee Peterson MD     • hydrochlorothiazide  12 5 mg Oral Daily Baylee Peterson MD     • hydrOXYzine HCL  100 mg Oral Q6H PRN Max 4/day Baylee Peterson MD      Or   • LORazepam  2 mg Intramuscular Q6H PRN Baylee Peterson MD     • hydrOXYzine HCL  25 mg Oral Q6H PRN Max 4/day Brad Ogden MD     • LORazepam  1 mg Oral Q4H PRN Max 6/day Soco Vicente MD     • multivitamin-minerals  1 tablet Oral Daily Brad Ogden MD     • naltrexone  50 mg Oral Daily Luz Owen MD     • OLANZapine  5 mg Oral Q4H PRN Max 3/day Brad Ogden MD      Or   • OLANZapine  2 5 mg Intramuscular Q4H PRN Max 3/day Brad Ogden MD     • OLANZapine  5 mg Oral Q3H PRN Max 3/day Brad Ogden MD      Or   • OLANZapine  5 mg Intramuscular Q3H PRN Max 3/day Brad Ogden MD     • OLANZapine  2 5 mg Oral Q4H PRN Max 6/day Brad Ogden MD     • polyethylene glycol  17 g Oral Daily PRN Brad Ogden MD     • propranolol  10 mg Oral Q8H PRN Brad Ogden MD     • senna-docusate sodium  1 tablet Oral Daily PRN Brad Ogden MD     • thiamine  100 mg Oral Daily Brad Ogden MD     • traZODone  50 mg Oral HS PRN Brad Ogden MD         Labs: I have personally reviewed all pertinent laboratory/tests results  Most Recent Labs:     Results from the past 24 hours: No results found for this or any previous visit (from the past 24 hour(s))    Most Recent Labs:   Lab Results   Component Value Date    WBC 7 36 03/21/2023    RBC 5 36 03/21/2023    HGB 14 6 03/21/2023    HCT 45 0 03/21/2023     03/21/2023    RDW 13 2 03/21/2023    NEUTROABS 2 94 03/21/2023    SODIUM 137 03/21/2023    K 3 9 03/21/2023    CL 97 03/21/2023    CO2 29 03/21/2023    BUN 10 03/21/2023    CREATININE 0 95 03/21/2023    GLUC 109 03/21/2023    CALCIUM 10 0 03/21/2023    AST 26 03/21/2023    ALT 31 03/21/2023    ALKPHOS 37 03/21/2023    TP 7 3 03/21/2023    ALB 4 2 03/21/2023    TBILI 0 76 03/21/2023    CHOLESTEROL 241 (H) 03/21/2023    HDL 39 (L) 03/21/2023    TRIG 164 (H) 03/21/2023    LDLCALC 169 (H) 03/21/2023    [place] 202 03/21/2023    [id number] 3 727 03/21/2023     Last Laboratory Results with Depakote and/or Tegretol levels:   Lab Results   Component Value Date    WBC 7 36 03/21/2023    RBC 5 36 03/21/2023    HGB 14 6 03/21/2023    HCT 45 0 03/21/2023     03/21/2023    RDW 13 2 03/21/2023    NEUTROABS 2 94 03/21/2023    SODIUM 137 03/21/2023    K 3 9 03/21/2023    CL 97 03/21/2023    CO2 29 03/21/2023    BUN 10 03/21/2023    CREATININE 0 95 03/21/2023    GLUC 109 03/21/2023    CALCIUM 10 0 03/21/2023    AST 26 03/21/2023    ALT 31 03/21/2023    ALKPHOS 37 03/21/2023    TP 7 3 03/21/2023    ALB 4 2 03/21/2023    TBILI 0 76 03/21/2023     Last Laboratory Results with Lithium level:   Lab Results   Component Value Date    SODIUM 137 03/21/2023    K 3 9 03/21/2023    CL 97 03/21/2023    CO2 29 03/21/2023    BUN 10 03/21/2023    CREATININE 0 95 03/21/2023    GLUC 109 03/21/2023    CALCIUM 10 0 03/21/2023     Labs in last 72 hours: No results for input(s): WBC, RBC, HGB, HCT, PLT, RDW, TOTANEUTABS, NEUTROABS, SODIUM, K, CL, CO2, BUN, CREATININE, GLUC, CALCIUM, AST, ALT, ALKPHOS, TP, ALB, TBILI, CHOLESTEROL, HDL, TRIG, LDLCALC, VALPROICTOT, CARBAMAZEPIN, LITHIUM, AMMONIA, [id number], FREET4, T3FREE, PREGTESTUR, PREGSERUM, HCG, HCGQUANT, RPR in the last 72 hours    Admission Labs:   Admission on 03/18/2023   Component Date Value   • Amph/Meth UR 03/18/2023 Negative    • Barbiturate Ur 03/18/2023 Negative    • Benzodiazepine Urine 03/18/2023 Negative    • Cocaine Urine 03/18/2023 Negative    • Methadone Urine 03/18/2023 Negative    • Opiate Urine 03/18/2023 Negative    • PCP Ur 03/18/2023 Negative    • THC Urine 03/18/2023 Negative    • Oxycodone Urine 03/18/2023 Negative    • Sodium 03/18/2023 137    • Potassium 03/18/2023 3 7    • Chloride 03/18/2023 98    • CO2 03/18/2023 28    • ANION GAP 03/18/2023 11    • BUN 03/18/2023 10    • Creatinine 03/18/2023 0 85    • Glucose 03/18/2023 110    • Calcium 03/18/2023 10 0    • AST 03/18/2023 37    • ALT 03/18/2023 39    • Alkaline Phosphatase 03/18/2023 44    • Total Protein 03/18/2023 8 0    • Albumin 03/18/2023 4 6    • Total Bilirubin 03/18/2023 1 21 (H)    • eGFR 03/18/2023 110    • Magnesium 03/18/2023 1 8 (L)    • WBC 03/18/2023 6 62    • RBC 03/18/2023 5 40    • Hemoglobin 03/18/2023 14 4    • Hematocrit 03/18/2023 45 2    • MCV 03/18/2023 84    • MCH 03/18/2023 26 7 (L)    • MCHC 03/18/2023 31 9    • RDW 03/18/2023 13 2    • MPV 03/18/2023 10 3    • Platelets 77/82/5752 263    • nRBC 03/18/2023 0    • Neutrophils Relative 03/18/2023 53    • Immat GRANS % 03/18/2023 1    • Lymphocytes Relative 03/18/2023 37    • Monocytes Relative 03/18/2023 6    • Eosinophils Relative 03/18/2023 2    • Basophils Relative 03/18/2023 1    • Neutrophils Absolute 03/18/2023 3 54    • Immature Grans Absolute 03/18/2023 0 03    • Lymphocytes Absolute 03/18/2023 2 46    • Monocytes Absolute 03/18/2023 0 40    • Eosinophils Absolute 03/18/2023 0 15    • Basophils Absolute 03/18/2023 0 04    • Platelets 07/73/2591 263    • MPV 03/18/2023 10 3    • Protime 03/18/2023 12 9    • INR 03/18/2023 0 95    • PTT 03/18/2023 26    • Thrombin Time 03/18/2023 18 0    • Fibrinogen 03/18/2023 310    • TSH 3RD GENERATON 03/21/2023 3 727    • Cholesterol 03/21/2023 241 (H)    • Triglycerides 03/21/2023 164 (H)    • HDL, Direct 03/21/2023 39 (L)    • LDL Calculated 03/21/2023 169 (H)    • Non-HDL-Chol (CHOL-HDL) 03/21/2023 202    • Vitamin B-12 03/21/2023 370    • Folate 03/21/2023 16 8    • Vit D, 25-Hydroxy 03/21/2023 10 2 (L)    • SYPHILIS TOTAL ANTIBODY 03/21/2023 Non-reactive    • Sodium 03/21/2023 137    • Potassium 03/21/2023 3 9    • Chloride 03/21/2023 97    • CO2 03/21/2023 29    • ANION GAP 03/21/2023 11    • BUN 03/21/2023 10    • Creatinine 03/21/2023 0 95    • Glucose 03/21/2023 109    • Glucose, Fasting 03/21/2023 109 (H)    • Calcium 03/21/2023 10 0    • AST 03/21/2023 26    • ALT 03/21/2023 31    • Alkaline Phosphatase 03/21/2023 37    • Total Protein 03/21/2023 7 3    • Albumin 03/21/2023 4  2    • Total Bilirubin 03/21/2023 0 76    • eGFR 03/21/2023 101    • WBC 03/21/2023 7 36    • RBC 03/21/2023 5 36    • Hemoglobin 03/21/2023 14 6    • Hematocrit 03/21/2023 45 0    • MCV 03/21/2023 84    • MCH 03/21/2023 27 2    • MCHC 03/21/2023 32 4    • RDW 03/21/2023 13 2    • MPV 03/21/2023 10 1    • Platelets 55/72/8223 263    • nRBC 03/21/2023 0    • Neutrophils Relative 03/21/2023 40 (L)    • Immat GRANS % 03/21/2023 0    • Lymphocytes Relative 03/21/2023 51 (H)    • Monocytes Relative 03/21/2023 6    • Eosinophils Relative 03/21/2023 2    • Basophils Relative 03/21/2023 1    • Neutrophils Absolute 03/21/2023 2 94    • Immature Grans Absolute 03/21/2023 0 02    • Lymphocytes Absolute 03/21/2023 3 74    • Monocytes Absolute 03/21/2023 0 43    • Eosinophils Absolute 03/21/2023 0 18    • Basophils Absolute 03/21/2023 0 05    • Ventricular Rate 03/21/2023 86    • Atrial Rate 03/21/2023 86    • FL Interval 03/21/2023 160    • QRSD Interval 03/21/2023 96    • QT Interval 03/21/2023 368    • QTC Interval 03/21/2023 440    • P Axis 03/21/2023 42    • QRS Axis 03/21/2023 25    • T Wave Axis 03/21/2023 64        Progress Toward Goals: improving    Risks / Benefits of Treatment:    Risks, benefits, and possible side effects of medications explained to patient and patient verbalizes understanding and agreement for treatment  Counseling / Coordination of Care: Total floor / unit time spent today 15 minutes  Greater than 50% of total time was spent with the patient and / or family counseling and / or coordination of care  A description of counseling / coordination of care:  Patient's progress discussed with staff in treatment team meeting  Treatment plan, treatment progress and medication changes were reviewed with nursing staff, pharmacy service, and case management in interdisciplinary treatment team meeting  Medications, treatment progress and treatment plan reviewed with patient    Recent stressors including drug use problems and alcohol use problems discussed with patient  Educated on importance of medication and treatment compliance  Reassurance and supportive therapy provided  Encouraged participation in milieu and group therapy on the unit        [place], [place] 03/25/23

## 2023-03-25 NOTE — NURSING NOTE
"Patient denies AVH/SI/HI  He reports sleeping about 5 hours  He states his depression is \"about a 5\" patient and this RN discussed him calling his partner, and he is worried about that phone call, but he knows its important he talks to her and apologizes so he can move on, forgive himself, and to ultimately wants to be a good father to his children  Patient expresses grief about his actions, but is hopeful  He reports getting a shave in, and having clean clothes makes him feel more human and has lessened his depression  He has had no behavioral issues to note     "

## 2023-03-25 NOTE — NURSING NOTE
Patient verbalized a headache he rated at a 9/10 at 2102 and now patient is asleep  Tylenol 975 mg po effective

## 2023-03-25 NOTE — NURSING NOTE
"[phone]: patient states he \"pulled something in my back while I was sleeping\" relating to muscle pain and referring to upper right shoulder, trapezius, 9/10  Given 975mg of tylenol      1702: tylenol minimally effective  Patient reports decreased pain of 7 from 9, and is resting in bed     "

## 2023-03-26 RX ADMIN — HYDROCHLOROTHIAZIDE 12.5 MG: 25 TABLET ORAL at 08:35

## 2023-03-26 RX ADMIN — MULTIPLE VITAMINS W/ MINERALS TAB 1 TABLET: TAB ORAL at 08:35

## 2023-03-26 RX ADMIN — THIAMINE HCL TAB 100 MG 100 MG: 100 TAB at 08:35

## 2023-03-26 RX ADMIN — METHOCARBAMOL TABLETS 500 MG: 500 TABLET, COATED ORAL at 06:23

## 2023-03-26 RX ADMIN — FOLIC ACID 1 MG: 1 TABLET ORAL at 08:35

## 2023-03-26 RX ADMIN — ESCITALOPRAM OXALATE 10 MG: 10 TABLET ORAL at 08:35

## 2023-03-26 RX ADMIN — NALTREXONE HYDROCHLORIDE 50 MG: 50 TABLET, FILM COATED ORAL at 08:35

## 2023-03-26 NOTE — NURSING NOTE
Patient states that his depression is lessened today, and he is nervous about calling his wife/ex-wife today about what has happened, but he knows it is part of his recovery and needs to do it  He states he has no AVH/SI/HI  He states ever since the 10pm dose of robaxin he has had nausea, although it is starting to get better  He states he did not like the medicine, and how it made him feel  He would like to continue to take only tylenol instead  He is medication compliant and appropriate

## 2023-03-26 NOTE — NURSING NOTE
Pt is pleasant and cooperative  Pt can be seen in the day room watching TV  Pt is med compliant  Pt denies depression and anxiety  Pt denies SI/HI and AH/VH  No unmet needs reported  Will continue to monitor

## 2023-03-26 NOTE — PROGRESS NOTES
Progress Note - Behavioral Health   Oma Conteh 45 y o  male MRN: [id number]  Unit/Bed#: U 340-01 Encounter: [id number]    Assessment/Plan   Principal Problem:    Major depressive disorder, recurrent, unspecified ([facility] [address] )  Active Problems:    Elevated blood pressure reading    Medical clearance for psychiatric admission    Vitamin D deficiency    Elevated lipids    Class 3 severe obesity due to excess calories without serious comorbidity with body mass index (BMI) of 45 0 to 49 9 in adult [facility])    Recommended Treatment:     All current active medications have been reviewed  Encourage group therapy, milieu therapy and occupational therapy  Behavioral Health checks every 7 minutes  Requires continued inpatient treatment due to chronic illness and high risk of decompensation if discharged before long term stability is achieved  Requires continued inpatient treatment while awaiting placement due to chronic illness and high risk of decompensation if discharged to an unstructured environment  Medical management per SLIM  Legal Status: 201  ----------------------------------------      Subjective: Patient was seen and examined today at the bedside  Patient is calm and cooperative and pleasant upon approach  He is taking his medication as prescribed  He is not participating in milieu therapy and reports being tired and sleeps all day  He reports being anxious about telling his wife about his recent drinking    He reported nausea from Robaxin    Behavior over the last 24 hours: No change  Sleep: hypersomnia  Appetite: normal  Medication side effects: No  ROS: nausea    Mental Status Evaluation:  Appearance:  age appropriate, casually dressed   Behavior:  pleasant, cooperative   Speech:  normal rate and volume   Mood:  anxious   Affect:  constricted   Thought Process:  organized, logical   Associations: intact associations   Thought Content:  normal, no overt delusions   Perceptual Disturbances: denies auditory hallucinations when asked   Risk Potential: Suicidal ideation - None at present  Homicidal ideation - None at present  Potential for aggression - No   Sensorium:  oriented to person, place and time/date   Memory:  recent and remote memory grossly intact   Consciousness:  alert and awake   Attention/Concentration: attention span and concentration are age appropriate   Insight:  improving   Judgment: improving   Gait/Station: normal gait/station   Motor Activity: no abnormal movements     Medications:   all current active meds have been reviewed, continue current psychiatric medications and current meds:   Current Facility-Administered Medications   Medication Dose Route Frequency   • acetaminophen (TYLENOL) tablet 650 mg  650 mg Oral Q6H PRN   • acetaminophen (TYLENOL) tablet 650 mg  650 mg Oral Q4H PRN   • acetaminophen (TYLENOL) tablet 975 mg  975 mg Oral Q6H PRN   • aluminum-magnesium hydroxide-simethicone (MYLANTA) oral suspension 30 mL  30 mL Oral Q4H PRN   • benztropine (COGENTIN) injection 1 mg  1 mg Intramuscular Q4H PRN Max 6/day   • benztropine (COGENTIN) tablet 1 mg  1 mg Oral Q4H PRN Max 6/day   • hydrOXYzine HCL (ATARAX) tablet 50 mg  50 mg Oral Q6H PRN Max 4/day    Or   • diphenhydrAMINE (BENADRYL) injection 50 mg  50 mg Intramuscular Q6H PRN   • ergocalciferol (VITAMIN D2) capsule 50,000 Units  50,000 Units Oral Weekly   • escitalopram (LEXAPRO) tablet 10 mg  10 mg Oral Daily   • folic acid (FOLVITE) tablet 1 mg  1 mg Oral Daily   • glycerin-hypromellose- (ARTIFICIAL TEARS) ophthalmic solution 1 drop  1 drop Both Eyes Q3H PRN   • hydrochlorothiazide (HYDRODIURIL) tablet 12 5 mg  12 5 mg Oral Daily   • hydrOXYzine HCL (ATARAX) tablet 100 mg  100 mg Oral Q6H PRN Max 4/day    Or   • LORazepam (ATIVAN) injection 2 mg  2 mg Intramuscular Q6H PRN   • hydrOXYzine HCL (ATARAX) tablet 25 mg  25 mg Oral Q6H PRN Max 4/day   • LORazepam (ATIVAN) tablet 1 mg  1 mg Oral Q4H PRN Max 6/day   • methocarbamol (ROBAXIN) tablet 500 mg  500 mg Oral Q8H [facility]   • multivitamin-minerals (CENTRUM) tablet 1 tablet  1 tablet Oral Daily   • naltrexone (REVIA) tablet 50 mg  50 mg Oral Daily   • OLANZapine (ZyPREXA) tablet 5 mg  5 mg Oral Q4H PRN Max 3/day    Or   • OLANZapine (ZyPREXA) IM injection 2 5 mg  2 5 mg Intramuscular Q4H PRN Max 3/day   • OLANZapine (ZyPREXA) tablet 5 mg  5 mg Oral Q3H PRN Max 3/day    Or   • OLANZapine (ZyPREXA) IM injection 5 mg  5 mg Intramuscular Q3H PRN Max 3/day   • OLANZapine (ZyPREXA) tablet 2 5 mg  2 5 mg Oral Q4H PRN Max 6/day   • polyethylene glycol (MIRALAX) packet 17 g  17 g Oral Daily PRN   • propranolol (INDERAL) tablet 10 mg  10 mg Oral Q8H PRN   • senna-docusate sodium (SENOKOT S) 8 6-50 mg per tablet 1 tablet  1 tablet Oral Daily PRN   • thiamine tablet 100 mg  100 mg Oral Daily   • traZODone (DESYREL) tablet 50 mg  50 mg Oral HS PRN       Current Facility-Administered Medications   Medication Dose Route Frequency Provider Last Rate   • acetaminophen  650 mg Oral Q6H PRN Geneva Quispe MD     • acetaminophen  650 mg Oral Q4H PRN Geneva Quispe MD     • acetaminophen  975 mg Oral Q6H PRN Geneva Quispe MD     • aluminum-magnesium hydroxide-simethicone  30 mL Oral Q4H PRN Geneva Quispe MD     • benztropine  1 mg Intramuscular Q4H PRN Max 6/day Geneva Quispe MD     • benztropine  1 mg Oral Q4H PRN Max 6/day Geneva Quispe MD     • hydrOXYzine HCL  50 mg Oral Q6H PRN Max 4/day Geneva Quispe MD      Or   • diphenhydrAMINE  50 mg Intramuscular Q6H PRN Geneva Quispe MD     • ergocalciferol  50,000 Units Oral Weekly Darvin Chauhan PA-C     • escitalopram  10 mg Oral Daily Mathew Byers MD     • folic acid  1 mg Oral Daily Geneva Quispe MD     • glycerin-hypromellose-  1 drop Both Eyes Q3H PRN Geneva Quispe MD     • hydrochlorothiazide  12 5 mg Oral Daily Geneva Quispe MD     • hydrOXYzine HCL 100 mg Oral Q6H PRN Max 4/day Brad Ogden MD      Or   • LORazepam  2 mg Intramuscular Q6H PRN Brad Ogden MD     • hydrOXYzine HCL  25 mg Oral Q6H PRN Max 4/day Brad Ogden MD     • LORazepam  1 mg Oral Q4H PRN Max 6/day Soco Vicente MD     • methocarbamol  500 mg Oral [facility] Ned Silver PA-C     • multivitamin-minerals  1 tablet Oral Daily Brad Ogden MD     • naltrexone  50 mg Oral Daily Luz Owen MD     • OLANZapine  5 mg Oral Q4H PRN Max 3/day Brad Ogden MD      Or   • OLANZapine  2 5 mg Intramuscular Q4H PRN Max 3/day Brad Ogden MD     • OLANZapine  5 mg Oral Q3H PRN Max 3/day Brad Ogden MD      Or   • OLANZapine  5 mg Intramuscular Q3H PRN Max 3/day Brad Ogden MD     • OLANZapine  2 5 mg Oral Q4H PRN Max 6/day Brad Ogden MD     • polyethylene glycol  17 g Oral Daily PRN Brad Ogden MD     • propranolol  10 mg Oral Q8H PRN Brad Ogden MD     • senna-docusate sodium  1 tablet Oral Daily PRN Brad Ogden MD     • thiamine  100 mg Oral Daily Brad Ogden MD     • traZODone  50 mg Oral HS PRN Brad Ogden MD         Labs: I have personally reviewed all pertinent laboratory/tests results  Most Recent Labs:     Results from the past 24 hours: No results found for this or any previous visit (from the past 24 hour(s))    Most Recent Labs:   Lab Results   Component Value Date    WBC 7 36 03/21/2023    RBC 5 36 03/21/2023    HGB 14 6 03/21/2023    HCT 45 0 03/21/2023     03/21/2023    RDW 13 2 03/21/2023    NEUTROABS 2 94 03/21/2023    SODIUM 137 03/21/2023    K 3 9 03/21/2023    CL 97 03/21/2023    CO2 29 03/21/2023    BUN 10 03/21/2023    CREATININE 0 95 03/21/2023    GLUC 109 03/21/2023    CALCIUM 10 0 03/21/2023    AST 26 03/21/2023    ALT 31 03/21/2023    ALKPHOS 37 03/21/2023    TP 7 3 03/21/2023    ALB 4 2 03/21/2023    TBILI 0 76 03/21/2023 CHOLESTEROL 241 (H) 03/21/2023    HDL 39 (L) 03/21/2023    TRIG 164 (H) 03/21/2023    LDLCALC 169 (H) 03/21/2023    NONHDLC 202 03/21/2023    [id number] 3 727 03/21/2023     Last Laboratory Results with Depakote and/or Tegretol levels:   Lab Results   Component Value Date    WBC 7 36 03/21/2023    RBC 5 36 03/21/2023    HGB 14 6 03/21/2023    HCT 45 0 03/21/2023     03/21/2023    RDW 13 2 03/21/2023    NEUTROABS 2 94 03/21/2023    SODIUM 137 03/21/2023    K 3 9 03/21/2023    CL 97 03/21/2023    CO2 29 03/21/2023    BUN 10 03/21/2023    CREATININE 0 95 03/21/2023    GLUC 109 03/21/2023    CALCIUM 10 0 03/21/2023    AST 26 03/21/2023    ALT 31 03/21/2023    ALKPHOS 37 03/21/2023    TP 7 3 03/21/2023    ALB 4 2 03/21/2023    TBILI 0 76 03/21/2023     Last Laboratory Results with Lithium level:   Lab Results   Component Value Date    SODIUM 137 03/21/2023    K 3 9 03/21/2023    CL 97 03/21/2023    CO2 29 03/21/2023    BUN 10 03/21/2023    CREATININE 0 95 03/21/2023    GLUC 109 03/21/2023    CALCIUM 10 0 03/21/2023     Labs in last 72 hours: No results for input(s): WBC, RBC, HGB, HCT, PLT, RDW, TOTANEUTABS, NEUTROABS, SODIUM, K, CL, CO2, BUN, CREATININE, GLUC, CALCIUM, AST, ALT, ALKPHOS, TP, ALB, TBILI, CHOLESTEROL, HDL, TRIG, LDLCALC, VALPROICTOT, CARBAMAZEPIN, LITHIUM, AMMONIA, [id number], FREET4, T3FREE, PREGTESTUR, PREGSERUM, HCG, HCGQUANT, RPR in the last 72 hours    Admission Labs:   Admission on 03/18/2023   Component Date Value   • Amph/Meth UR 03/18/2023 Negative    • Barbiturate Ur 03/18/2023 Negative    • Benzodiazepine Urine 03/18/2023 Negative    • Cocaine Urine 03/18/2023 Negative    • Methadone Urine 03/18/2023 Negative    • Opiate Urine 03/18/2023 Negative    • PCP Ur 03/18/2023 Negative    • THC Urine 03/18/2023 Negative    • Oxycodone Urine 03/18/2023 Negative    • Sodium 03/18/2023 137    • Potassium 03/18/2023 3 7    • Chloride 03/18/2023 98    • CO2 03/18/2023 28    • ANION GAP 03/18/2023 11    • BUN 03/18/2023 10    • Creatinine 03/18/2023 0 85    • Glucose 03/18/2023 110    • Calcium 03/18/2023 10 0    • AST 03/18/2023 37    • ALT 03/18/2023 39    • Alkaline Phosphatase 03/18/2023 44    • Total Protein 03/18/2023 8 0    • Albumin 03/18/2023 4 6    • Total Bilirubin 03/18/2023 1 21 (H)    • eGFR 03/18/2023 110    • Magnesium 03/18/2023 1 8 (L)    • WBC 03/18/2023 6 62    • RBC 03/18/2023 5 40    • Hemoglobin 03/18/2023 14 4    • Hematocrit 03/18/2023 45 2    • MCV 03/18/2023 84    • MCH 03/18/2023 26 7 (L)    • MCHC 03/18/2023 31 9    • RDW 03/18/2023 13 2    • MPV 03/18/2023 10 3    • Platelets 49/37/9741 263    • nRBC 03/18/2023 0    • Neutrophils Relative 03/18/2023 53    • Immat GRANS % 03/18/2023 1    • Lymphocytes Relative 03/18/2023 37    • Monocytes Relative 03/18/2023 6    • Eosinophils Relative 03/18/2023 2    • Basophils Relative 03/18/2023 1    • Neutrophils Absolute 03/18/2023 3 54    • Immature Grans Absolute 03/18/2023 0 03    • Lymphocytes Absolute 03/18/2023 2 46    • Monocytes Absolute 03/18/2023 0 40    • Eosinophils Absolute 03/18/2023 0 15    • Basophils Absolute 03/18/2023 0 04    • Platelets 38/74/8041 263    • MPV 03/18/2023 10 3    • Protime 03/18/2023 12 9    • INR 03/18/2023 0 95    • PTT 03/18/2023 26    • Thrombin Time 03/18/2023 18 0    • Fibrinogen 03/18/2023 310    • TSH 3RD GENERATON 03/21/2023 3 727    • Cholesterol 03/21/2023 241 (H)    • Triglycerides 03/21/2023 164 (H)    • HDL, Direct 03/21/2023 39 (L)    • LDL Calculated 03/21/2023 169 (H)    • Non-HDL-Chol (CHOL-HDL) 03/21/2023 202    • Vitamin B-12 03/21/2023 370    • Folate 03/21/2023 16 8    • Vit D, 25-Hydroxy 03/21/2023 10 2 (L)    • SYPHILIS TOTAL ANTIBODY 03/21/2023 Non-reactive    • Sodium 03/21/2023 137    • Potassium 03/21/2023 3 9    • Chloride 03/21/2023 97    • CO2 03/21/2023 29    • ANION GAP 03/21/2023 11    • BUN 03/21/2023 10    • Creatinine 03/21/2023 0 95    • Glucose 03/21/2023 109    • Glucose, Fasting 03/21/2023 109 (H)    • Calcium 03/21/2023 10 0    • AST 03/21/2023 26    • ALT 03/21/2023 31    • Alkaline Phosphatase 03/21/2023 37    • Total Protein 03/21/2023 7 3    • Albumin 03/21/2023 4 2    • Total Bilirubin 03/21/2023 0 76    • eGFR 03/21/2023 101    • WBC 03/21/2023 7 36    • RBC 03/21/2023 5 36    • Hemoglobin 03/21/2023 14 6    • Hematocrit 03/21/2023 45 0    • MCV 03/21/2023 84    • MCH 03/21/2023 27 2    • MCHC 03/21/2023 32 4    • RDW 03/21/2023 13 2    • MPV 03/21/2023 10 1    • Platelets 58/64/2876 263    • nRBC 03/21/2023 0    • Neutrophils Relative 03/21/2023 40 (L)    • Immat GRANS % 03/21/2023 0    • Lymphocytes Relative 03/21/2023 51 (H)    • Monocytes Relative 03/21/2023 6    • Eosinophils Relative 03/21/2023 2    • Basophils Relative 03/21/2023 1    • Neutrophils Absolute 03/21/2023 2 94    • Immature Grans Absolute 03/21/2023 0 02    • Lymphocytes Absolute 03/21/2023 3 74    • Monocytes Absolute 03/21/2023 0 43    • Eosinophils Absolute 03/21/2023 0 18    • Basophils Absolute 03/21/2023 0 05    • Ventricular Rate 03/21/2023 86    • Atrial Rate 03/21/2023 86    • DE Interval 03/21/2023 160    • QRSD Interval 03/21/2023 96    • QT Interval 03/21/2023 368    • QTC Interval 03/21/2023 440    • P Axis 03/21/2023 42    • QRS Axis 03/21/2023 25    • T Wave Axis 03/21/2023 64        Progress Toward Goals: improving    Risks / Benefits of Treatment:    Risks, benefits, and possible side effects of medications explained to patient and patient verbalizes understanding and agreement for treatment  Counseling / Coordination of Care: Total floor / unit time spent today 15 minutes  Greater than 50% of total time was spent with the patient and / or family counseling and / or coordination of care  A description of counseling / coordination of care:  Patient's progress discussed with staff in treatment team meeting    Treatment plan, treatment progress and medication changes were reviewed with nursing staff, pharmacy service, and case management in interdisciplinary treatment team meeting  Medications, treatment progress and treatment plan reviewed with patient  Recent stressors including drug use problems and alcohol use problems discussed with patient  Educated on importance of medication and treatment compliance  Reassurance and supportive therapy provided  Encouraged participation in milieu and group therapy on the unit        [place], [place] 03/26/23

## 2023-03-27 RX ADMIN — MULTIPLE VITAMINS W/ MINERALS TAB 1 TABLET: TAB ORAL at 08:22

## 2023-03-27 RX ADMIN — FOLIC ACID 1 MG: 1 TABLET ORAL at 08:22

## 2023-03-27 RX ADMIN — HYDROCHLOROTHIAZIDE 12.5 MG: 25 TABLET ORAL at 08:22

## 2023-03-27 RX ADMIN — THIAMINE HCL TAB 100 MG 100 MG: 100 TAB at 08:22

## 2023-03-27 RX ADMIN — ESCITALOPRAM OXALATE 10 MG: 10 TABLET ORAL at 08:22

## 2023-03-27 RX ADMIN — NALTREXONE HYDROCHLORIDE 50 MG: 50 TABLET, FILM COATED ORAL at 08:22

## 2023-03-27 NOTE — PROGRESS NOTES
03/27/23 0930   Activity/Group Checklist   Group Community meeting   Attendance Attended   Attendance Duration (min) 16-30   Interactions Interacted appropriately   Affect/Mood Appropriate   Goals Achieved Able to listen to others; Able to engage in interactions

## 2023-03-27 NOTE — PROGRESS NOTES
"Met with Joss Kuo to discuss his powerless and unamanageability  He did not write it  He stated he thought about it  We discussed the importance of making a decision and commitment to his goals  He reports he has done this but has relapsed a number of times  Identified that his shame and guilt along with the fact he does not feel good enough is the issue  He knows that he has a genetic predisposition and has learned this from his parents  He takes responsibility for his actions but appears to wallow in it  He over thinks things and struggles with the commitment and doing things that are uncomfortable  He was directed to use affirmations \"I am good enough\" fives times a day in front of the mirror and to write on his first step to help him internalize his desire to change  Encouraged him to practice his spiritual beliefs    "

## 2023-03-27 NOTE — PROGRESS NOTES
"Progress Note - Shaina [age] y o  male MRN: [id number]  Unit/Bed#: [facility] 340-01 Encounter: [id number]    Assessment/Plan   Principal Problem:    Major depressive disorder, recurrent, unspecified ([address] )  Active Problems:    Elevated blood pressure reading    Medical clearance for psychiatric admission    Vitamin D deficiency    Elevated lipids    Class 3 severe obesity due to excess calories without serious comorbidity with body mass index (BMI) of 45 0 to 49 9 in [facility])      Recommended Treatment:   Continue Lexapro 10 mg daily, for mood/anxiety  Continue naltrexone 50 mg daily, for alcohol use disorder  Discontinue scheduled Robaxin    Continue with group therapy, milieu therapy and occupational therapy  Continue frequent safety checks and vitals per unit protocol  Case discussed with treatment team   Continue with [facility] medical management as indicated  Continue coordinating with case management regarding disposition  Risks, benefits and possible side effects of Medications: Risks, benefits, and possible side effects of medications have been explained to the patient, who verbalizes understanding    Legal Status: 201  ------------------------------------------------------------    Subjective: Per nursing report, Sharmin Arshad has been \"pleasant and cooperative  Pt can be seen in the day room watching TV  Pt refused his HS medication Robaxin stating \"I told them I don't want to take that any more\"  Pt denies depression and anxiety  Pt denies SI/HI and AH/VH  No unmet needs reported  \"    Today, patient reports his mood is \"tired\" which she attributes to only obtaining 4 hours of sleep overnight  Patient states that he normally only sleeps 4-5 hours a night and that it has been this way for many years  Patient states that he often has trouble staying up during the day due to decreased energy and tiredness but is typically able to push himself to continue throughout the day    Patient " "reports decreased appetite currently states his depression is 5-6/10  Patient denies any medication side effects related to the Lexapro  He denies any current alcohol cravings as well  Patient states that he still has to talk with his wife and have a difficult conversation  Patient says that he knows it may be uncomfortable with that he has to do it and let her have a chance to speak her mind and communicate her feelings to them about everything that is transpired so that they can moved forward and coparent their child  Patient was offered encouragement and told that following the conversation he could speak with us tomorrow or during individual therapy about how it went  Patient denies any current suicidal ideation, homicidal ideation, auditory visual hallucinations  PRNs overnight: None  VS: Reviewed, within normal limits    Progress Toward Goals: gradual improvement    Psychiatric Review of Systems:  Behavior over the last 24 hours: unchanged  Sleep: difficulty falling asleep  Appetite: adequate, decreased from baseline  Medication side effects: none verbalized  ROS: Complete review of systems is negative except as noted above      Vital signs in last 24 hours:  Temp:  [97 8 °F (36 6 °C)-97 9 °F (36 6 °C)] 97 9 °F (36 6 °C)  HR:  [76-81] 76  Resp:  [16] 16  BP: (143-149)/(71) 143/71    Mental Status Exam:  Appearance:  casually dressed, dressed appropriately, adequate grooming   Behavior:  pleasant, cooperative, calm   Speech:  normal rate and volume   Mood:  \"tired\"   Affect:  constricted   Thought Process:  organized, logical, coherent, goal directed, linear   Associations: intact associations   Thought Content:  no overt delusions   Perceptual Disturbances: Denies auditory or visual hallucinations and Does not appear to be responding to internal stimuli   Risk Potential: Suicidal ideation - None at present  Homicidal ideation - None at present  Potential for aggression - No   Sensorium:  oriented to " person, place and time/date   Memory:  recent and remote memory grossly intact   Consciousness:  alert and awake   Attention/Concentration: attention span and concentration are age appropriate   Insight:  limited, improving   Judgment: limited, improving   Gait/Station: normal gait/station   Motor Activity: no abnormal movements     Current Medications:  Current Facility-Administered Medications   Medication Dose Route Frequency Provider Last Rate   • acetaminophen  650 mg Oral Q6H PRN Ramon MD John     • acetaminophen  650 mg Oral Q4H PRN Ramon MD John     • acetaminophen  975 mg Oral Q6H PRN Ramon MD John     • aluminum-magnesium hydroxide-simethicone  30 mL Oral Q4H PRN Ramon MD John     • benztropine  1 mg Intramuscular Q4H PRN Max 6/day Ramon MD John     • benztropine  1 mg Oral Q4H PRN Max 6/day Ramon MD John     • hydrOXYzine HCL  50 mg Oral Q6H PRN Max 4/day Ramon MD John      Or   • diphenhydrAMINE  50 mg Intramuscular Q6H PRN Ramon MD John     • ergocalciferol  50,000 Units Oral Weekly Kezia Richey PA-C     • escitalopram  10 mg Oral Daily Peder Fabry, MD     • folic acid  1 mg Oral Daily Ramon Lopez MD     • glycerin-hypromellose-  1 drop Both Eyes Q3H PRN Ramon Lopez MD     • hydrochlorothiazide  12 5 mg Oral Daily Ramon Lopez MD     • hydrOXYzine HCL  100 mg Oral Q6H PRN Max 4/day Ramon Lopez MD      Or   • LORazepam  2 mg Intramuscular Q6H PRN Ramon Lopez MD     • hydrOXYzine HCL  25 mg Oral Q6H PRN Max 4/day Ramon Lopez MD     • LORazepam  1 mg Oral Q4H PRN Max 6/day Oscar Loja MD     • multivitamin-minerals  1 tablet Oral Daily Ramon Lopez MD     • naltrexone  50 mg Oral Daily Peder Fabry, MD     • OLANZapine  5 mg Oral Q4H PRN Max 3/day Ramon Lopez MD      Or   • OLANZapine  2 5 mg Intramuscular Q4H PRN Max 3/day Sandro Rossi MD     • OLANZapine  5 mg Oral Q3H PRN Max 3/day Sandro Rossi MD      Or   • OLANZapine  5 mg Intramuscular Q3H PRN Max 3/day Sandro Rossi MD     • OLANZapine  2 5 mg Oral Q4H PRN Max 6/day Sandro Rossi MD     • polyethylene glycol  17 g Oral Daily PRN Sandro Rossi MD     • propranolol  10 mg Oral Q8H PRN Sandro Rossi MD     • senna-docusate sodium  1 tablet Oral Daily PRN Sandro Rossi MD     • thiamine  100 mg Oral Daily Sandro Rossi MD     • traZODone  50 mg Oral HS PRN Sandro Rossi MD         Behavioral Health Medications: all current active meds have been reviewed  Changes as in plan section above  Laboratory results:  I have personally reviewed all pertinent laboratory/tests results  No results found for this or any previous visit (from the past 48 hour(s))  This note has been constructed using a voice recognition system  There may be translation, syntax, or grammatical errors  If you have any questions, please contact the dictating author      Tomas Peraza MD PGY1

## 2023-03-27 NOTE — NURSING NOTE
Patient has been about the unit  Playing cards with peers  Denies SI, reports decreased depression  Compliant with medications  Will monitor

## 2023-03-27 NOTE — NURSING NOTE
"Pt is pleasant and cooperative  Pt can be seen in the day room watching TV  Pt refused his HS medication Robaxin stating \"I told them I don't want to take that any more\"  Pt denies depression and anxiety  Pt denies SI/HI and AH/VH  No unmet needs reported  Will continue to monitor    "

## 2023-03-27 NOTE — NURSING NOTE
The pt was observed in the dayroom watching tv , this writer assessed him, he appeared sad and depressed  The pt admitted to having some depression and informed this writer he was feeling a little anxious because he was going to call his wife later in the evening  The pt denied SI/HI, A/V hallucination and he denied pain for this writer  The pt informed this writer he was looking forward to going to rehab and he is hopeful he will be successful with his treatment  The pt attended groups and was also observed socializing with his peers  The pt did not require any prns from this writer   The pt informed this writer he was too nervous to call his wife ramy he said he will try tomorrow

## 2023-03-27 NOTE — PROGRESS NOTES
03/27/23 1300   Activity/Group Checklist   Group Other (Comment)  (Recovery group)   Attendance Attended   Attendance Duration (min) 46-60   Interactions Interacted appropriately   Affect/Mood Appropriate   Goals Achieved Able to listen to others; Able to engage in interactions; Able to reflect/comment on own behavior

## 2023-03-27 NOTE — PROGRESS NOTES
03/27/23 0809   Team Meeting   Meeting Type Daily Rounds   Team Members Present   Team Members Present Physician;Nurse;   Physician Team Member [facility]   Nursing Team Member [facility] Management Team Member Samy   Patient/Family Present   Patient Present No   Patient's Family Present No   Pt requesting robaxin be d/c  Pt reporting 5 hours of sleep Friday night  Discussing calling his wife to apologize  Pt pleasant, cooperative, med/meal compliant  Denies SI/HI/AVH  Bed search to begin this week for rehab placement

## 2023-03-28 RX ADMIN — ESCITALOPRAM OXALATE 10 MG: 10 TABLET ORAL at 08:25

## 2023-03-28 RX ADMIN — MULTIPLE VITAMINS W/ MINERALS TAB 1 TABLET: TAB ORAL at 08:25

## 2023-03-28 RX ADMIN — NALTREXONE HYDROCHLORIDE 50 MG: 50 TABLET, FILM COATED ORAL at 08:25

## 2023-03-28 RX ADMIN — THIAMINE HCL TAB 100 MG 100 MG: 100 TAB at 08:25

## 2023-03-28 RX ADMIN — FOLIC ACID 1 MG: 1 TABLET ORAL at 08:25

## 2023-03-28 RX ADMIN — ERGOCALCIFEROL 50000 UNITS: 1.25 CAPSULE ORAL at 08:25

## 2023-03-28 RX ADMIN — HYDROCHLOROTHIAZIDE 12.5 MG: 25 TABLET ORAL at 08:25

## 2023-03-28 RX ADMIN — ACETAMINOPHEN 975 MG: 325 TABLET ORAL at 14:04

## 2023-03-28 NOTE — PROGRESS NOTES
03/28/23 0835   Team Meeting   Meeting Type Daily Rounds   Team Members Present   Team Members Present Physician;Nurse;   Physician Team Member [facility]   Nursing Team Member [facility] Management Team Member [place]   Patient/Family Present   Patient Present No   Patient's Family Present No   Pt reports some depression and anxiety  Pt reports sleeping is difficult  Bed search to begin possibly this week

## 2023-03-28 NOTE — PROGRESS NOTES
03/28/23 1300   Activity/Group Checklist   Group   (recovery group)   Attendance Attended   Attendance Duration (min) 46-60   Interactions Interacted appropriately   Affect/Mood Appropriate   Goals Achieved Discussed coping strategies; Discussed self-esteem issues; Displayed empathy;Able to listen to others; Able to engage in interactions; Able to reflect/comment on own behavior;Able to self-disclose; Able to recieve feedback; Able to give feedback to another

## 2023-03-28 NOTE — PROGRESS NOTES
Followed-up with pt following music group, as last song seemed to upset him  Pt was actually appreciative of the group allowing him to express his feelings and demonstrated insight into the importance of being able to recognize these things in himself  SALINAS/L discussed the benefit of using music as a coping skill and provided pt with more songs/lyrics he may find relatable  Encouraged pt to reach out to SALINAS/L and other staff if he needed to discuss anything that he may find difficult to deal with in general  Plan to follow-up again tomorrow

## 2023-03-28 NOTE — NURSING NOTE
Patient visible in the milieu as of this time  Interacting appropriately with peers  Denies SI, continues to appear depressed  Reports poor sleep last night  Compliant with medications  Will monitor

## 2023-03-28 NOTE — PROGRESS NOTES
03/28/23 1000   Activity/Group Checklist   Group Other (Comment)  (Guilt and Shame)   Attendance Attended   Attendance Duration (min) 16-30   Interactions Interacted appropriately   Affect/Mood Appropriate   Goals Achieved Identified feelings; Able to listen to others; Able to engage in interactions; Able to reflect/comment on own behavior;Able to self-disclose

## 2023-03-28 NOTE — PROGRESS NOTES
"Progress Note - Shaina [age] y o  male MRN: [id number]  Unit/Bed#: [facility] 340-01 Encounter: [id number]    Assessment/Plan   Principal Problem:    Major depressive disorder, recurrent, unspecified ([address] )  Active Problems:    Elevated blood pressure reading    Medical clearance for psychiatric admission    Vitamin D deficiency    Elevated lipids    Class 3 severe obesity due to excess calories without serious comorbidity with body mass index (BMI) of 45 0 to 49 9 in [facility])      Recommended Treatment:   Continue Lexapro 10 mg daily, for mood/anxiety  Continue naltrexone 50 mg daily, for alcohol use disorder    Continue with group therapy, milieu therapy and occupational therapy  Continue frequent safety checks and vitals per unit protocol  Case discussed with treatment team   Continue with SLIM medical management as indicated  Continue coordinating with case management regarding disposition  Risks, benefits and possible side effects of Medications: Risks, benefits, and possible side effects of medications have been explained to the patient, who verbalizes understanding    Legal Status: 201  ------------------------------------------------------------    Subjective: Per nursing report, Guerrero Cartwright has been \"observed in the dayroom watching tv , this writer assessed him, he appeared sad and depressed  The pt admitted to having some depression and informed this writer he was feeling a little anxious because he was going to call his wife later in the evening  The pt denied SI/HI, A/V hallucination and he denied pain for this writer  The pt informed this writer he was looking forward to going to rehab and he is hopeful he will be successful with his treatment  The pt attended groups and was also observed socializing with his peers  The pt did not require any prns from this writer  \"    Today, patient reports his mood is \"tired\" and reports decreased sleep overnight    He states that his " "depression is \"about the same\" as yesterday  He also said that he is still eating less than before she partially attributes to decreased \"sense of taste\" but states that he feels like he is getting enough to sustain himself  Patient reports anxious, worrying thoughts concerning calling his estranged wife for difficult phone call  Patient states that he was out in the common area reading last night because he could not go to sleep  He also states that the rapid response called last night also made it difficult for him to go back to sleep  He reports feeling tired during the day, often having to take naps, though yesterday patient states he tried to only take one nap  Patient states that the trazodone that he took previously did not work for him and that he has tried over-the-counter melatonin in the past and has not found it helpful either  Patient was educated about sleep hygiene, and encouraged to avoid daytime naps and try to only go to bed when he is tired  When he has anxious, worrying thoughts, patient was encouraged to get out of bed for a while and engage in another quiet task, like reading, until he feels tired again, then return to bed and see if he is able to go to sleep  Patient said that he would try to utilize some of these techniques  Patient also said that he will try to reach out to his estranged wife again to day to tell her that he wants to talk and try to set up a time to have the conversation  Patient also says that he intends to write down his thoughts of time so that he can communicate his thoughts and feelings clearly with his wife and later talk with the treatment team or therapists about how it went  She denies any current suicidal ideation, homicidal ideation, auditory or visual hallucinations         PRNs overnight: None  VS: Reviewed, within normal limits    Progress Toward Goals: slow improvement    Psychiatric Review of Systems:  Behavior over the last 24 hours: " "unchanged  Sleep: insomnia and difficulty falling asleep  Appetite: adequate, decreased from baseline  Medication side effects: none verbalized  ROS: Complete review of systems is negative except as noted above  Vital signs in last 24 hours:  Temp:  [96 7 °F (35 9 °C)-97 4 °F (36 3 °C)] 96 7 °F (35 9 °C)  HR:  [77-79] 77  Resp:  [18] 18  BP: (144-157)/(73-93) 157/93    Mental Status Exam:  Appearance:  age appropriate, casually dressed, dressed appropriately, adequate grooming   Behavior:  pleasant, cooperative, calm   Speech:  normal rate and volume   Mood:   \"Tired\"   Affect:  constricted   Thought Process:  organized, logical, coherent, goal directed   Associations: intact associations   Thought Content:  no overt delusions   Perceptual Disturbances: Denies auditory or visual hallucinations and Does not appear to be responding to internal stimuli   Risk Potential: Suicidal ideation - None at present  Homicidal ideation - None at present  Potential for aggression - Not at present   Sensorium:  oriented to person, place and time/date   Memory:  recent and remote memory grossly intact   Consciousness:  alert and awake   Attention/Concentration: attention span and concentration are age appropriate   Insight:  limited   Judgment: limited   Gait/Station: normal gait/station   Motor Activity: no abnormal movements     Current Medications:  Current Facility-Administered Medications   Medication Dose Route Frequency Provider Last Rate   • acetaminophen  650 mg Oral Q6H PRN Johnson Vidal MD     • acetaminophen  650 mg Oral Q4H PRN Johnson Vidal MD     • acetaminophen  975 mg Oral Q6H PRN Johnson Vidal MD     • aluminum-magnesium hydroxide-simethicone  30 mL Oral Q4H PRN Johnson Vidal MD     • benztropine  1 mg Intramuscular Q4H PRN Max 6/day Johnson Vidal MD     • benztropine  1 mg Oral Q4H PRN Max 6/day Johnson Vidal MD     • hydrOXYzine HCL  50 mg Oral Q6H PRN Max 4/day " Magy Swift MD      Or   • diphenhydrAMINE  50 mg Intramuscular Q6H PRN Magy Swift MD     • ergocalciferol  50,000 Units Oral Weekly Adrianne Stevenson PA-C     • escitalopram  10 mg Oral Daily Acosta Ramos MD     • folic acid  1 mg Oral Daily Magy Swift MD     • glycerin-hypromellose-  1 drop Both Eyes Q3H PRN Magy Swift MD     • hydrochlorothiazide  12 5 mg Oral Daily Magy Swift MD     • hydrOXYzine HCL  100 mg Oral Q6H PRN Max 4/day Magy Swift MD      Or   • LORazepam  2 mg Intramuscular Q6H PRN Magy Swift MD     • hydrOXYzine HCL  25 mg Oral Q6H PRN Max 4/day Magy Swift MD     • LORazepam  1 mg Oral Q4H PRN Max 6/day Ventura Medeiros MD     • multivitamin-minerals  1 tablet Oral Daily Magy Swift MD     • naltrexone  50 mg Oral Daily Acosta Ramos MD     • OLANZapine  5 mg Oral Q4H PRN Max 3/day Magy Swift MD      Or   • OLANZapine  2 5 mg Intramuscular Q4H PRN Max 3/day Magy Swift MD     • OLANZapine  5 mg Oral Q3H PRN Max 3/day Magy Swift MD      Or   • OLANZapine  5 mg Intramuscular Q3H PRN Max 3/day Magy Swift MD     • OLANZapine  2 5 mg Oral Q4H PRN Max 6/day Magy Swift MD     • polyethylene glycol  17 g Oral Daily PRN Magy Swift MD     • propranolol  10 mg Oral Q8H PRN Magy Swift MD     • senna-docusate sodium  1 tablet Oral Daily PRN Magy Swift MD     • thiamine  100 mg Oral Daily Magy Swift MD     • traZODone  50 mg Oral HS PRN Magy Swift MD         Behavioral Health Medications: all current active meds have been reviewed  Changes as in plan section above  Laboratory results:  I have personally reviewed all pertinent laboratory/tests results  No results found for this or any previous visit (from the past 48 hour(s))  This note has been constructed using a voice recognition system  There may be translation, syntax, or grammatical errors  If you have any questions, please contact the dictating author      Mathew Byers MD PGY1

## 2023-03-28 NOTE — PROGRESS NOTES
03/28/23 0938   Activity/Group Checklist   Group Community meeting   Attendance Attended   Attendance Duration (min) 16-30   Interactions Interacted appropriately   Affect/Mood Appropriate   Goals Achieved Able to listen to others; Able to engage in interactions; Able to self-disclose

## 2023-03-28 NOTE — NURSING NOTE
Patient visible on unit   Patient reported to having some depression, but have hop for his discharge distention  Patient denies SI,HI,and  AVH    Patient remain Q 7 min check

## 2023-03-28 NOTE — NURSING NOTE
Pt reported severe lower back pain  Requested and received PRN Tylenol 975 mg  Will monitor for effectiveness

## 2023-03-29 PROBLEM — Z00.8 MEDICAL CLEARANCE FOR PSYCHIATRIC ADMISSION: Status: RESOLVED | Noted: 2023-03-21 | Resolved: 2023-03-29

## 2023-03-29 PROBLEM — G47.00 INSOMNIA: Status: ACTIVE | Noted: 2023-03-29

## 2023-03-29 RX ORDER — TRAZODONE HYDROCHLORIDE 50 MG/1
50 TABLET ORAL
Qty: 30 TABLET | Refills: 0 | Status: SHIPPED | OUTPATIENT
Start: 2023-03-29

## 2023-03-29 RX ORDER — THIAMINE MONONITRATE (VIT B1) 100 MG
100 TABLET ORAL DAILY
Qty: 30 TABLET | Refills: 0 | Status: SHIPPED | OUTPATIENT
Start: 2023-03-30

## 2023-03-29 RX ORDER — ERGOCALCIFEROL 1.25 MG/1
50000 CAPSULE ORAL WEEKLY
Qty: 4 CAPSULE | Refills: 0 | Status: SHIPPED | OUTPATIENT
Start: 2023-04-04 | End: 2023-05-10

## 2023-03-29 RX ORDER — NALTREXONE HYDROCHLORIDE 50 MG/1
50 TABLET, FILM COATED ORAL DAILY
Qty: 30 TABLET | Refills: 0 | Status: SHIPPED | OUTPATIENT
Start: 2023-03-30

## 2023-03-29 RX ORDER — HYDROCHLOROTHIAZIDE 12.5 MG/1
12.5 TABLET ORAL DAILY
Qty: 30 TABLET | Refills: 0 | Status: SHIPPED | OUTPATIENT
Start: 2023-03-30

## 2023-03-29 RX ORDER — FOLIC ACID 1 MG/1
1 TABLET ORAL DAILY
Qty: 30 TABLET | Refills: 0 | Status: SHIPPED | OUTPATIENT
Start: 2023-03-30

## 2023-03-29 RX ORDER — ESCITALOPRAM OXALATE 10 MG/1
10 TABLET ORAL DAILY
Qty: 30 TABLET | Refills: 0 | Status: SHIPPED | OUTPATIENT
Start: 2023-03-30

## 2023-03-29 RX ADMIN — FOLIC ACID 1 MG: 1 TABLET ORAL at 09:26

## 2023-03-29 RX ADMIN — NALTREXONE HYDROCHLORIDE 50 MG: 50 TABLET, FILM COATED ORAL at 09:26

## 2023-03-29 RX ADMIN — ACETAMINOPHEN 975 MG: 325 TABLET ORAL at 21:11

## 2023-03-29 RX ADMIN — ESCITALOPRAM OXALATE 10 MG: 10 TABLET ORAL at 09:26

## 2023-03-29 RX ADMIN — ACETAMINOPHEN 975 MG: 325 TABLET ORAL at 13:59

## 2023-03-29 RX ADMIN — THIAMINE HCL TAB 100 MG 100 MG: 100 TAB at 09:26

## 2023-03-29 RX ADMIN — MULTIPLE VITAMINS W/ MINERALS TAB 1 TABLET: TAB ORAL at 09:26

## 2023-03-29 RX ADMIN — HYDROCHLOROTHIAZIDE 12.5 MG: 25 TABLET ORAL at 09:26

## 2023-03-29 NOTE — PLAN OF CARE
CM notified HOST of pt's appropriateness for dc to rehab tomorrow  CM faxed updated clinical to HOST

## 2023-03-29 NOTE — NURSING NOTE
Patient visible on unit , social with peers  patient compliant with  The unit routine  Patient denies any unmeet needs   Patient remain Q 7 min check

## 2023-03-29 NOTE — PLAN OF CARE
Pt accepted into [facility] for inpatient rehab for 3/30  [facility] will pick pt up at 1330  Medications to be filled at [address] and go filled with pt to rehab

## 2023-03-29 NOTE — PROGRESS NOTES
Met with Sonja Mata to complete his relapse prevention in preparation to his bed search for rehab  He was able to identify his symptoms, warning signs, coping skills and support person  He knows he needs to recover and has fear of his ability to maintain it  He wants to increase his spiritual beliefs but is fearful he is not good enough for God  He plans to call his father in law to let his wife know he is trying to get in touch with her  His father in law is his support person who has 30 years in recovery

## 2023-03-29 NOTE — PROGRESS NOTES
03/29/23 1000   Activity/Group Checklist   Group Other (Comment)  (pyschodynamic group art therapy with materials and discussion)   Attendance Attended   Attendance Duration (min) 16-30   Interactions Interacted appropriately   Affect/Mood Appropriate   Goals Achieved Able to listen to others; Able to engage in interactions; Able to recieve feedback; Able to give feedback to another  (engaged materials, full participation)

## 2023-03-29 NOTE — PROGRESS NOTES
SALINAS Group Note     03/28/23 1100 03/28/23 1415   Activity/Group Checklist   Group Life Skills  (Values and Perspectives) Personal control  (Music and Lyrics)   Attendance Attended Attended   Attendance Duration (min) 46-60 Greater than 61   Interactions Interacted appropriately Interacted appropriately   Affect/Mood Appropriate;Calm Appropriate;Calm   Goals Achieved Identified feelings; Discussed coping strategies; Discussed self-esteem issues; Increased hopefulness; Able to listen to others; Able to engage in interactions; Able to reflect/comment on own behavior;Able to self-disclose; Able to recieve feedback; Able to give feedback to another  (Identified Ineffective Coping Skills) Identified feelings; Identified triggers; Discussed coping strategies; Able to listen to others; Able to engage in interactions; Able to reflect/comment on own behavior;Able to manage/cope with feelings; Able to self-disclose; Able to recieve feedback; Able to give feedback to another

## 2023-03-29 NOTE — PROGRESS NOTES
03/29/23 0818   Team Meeting   Meeting Type Daily Rounds   Team Members Present   Team Members Present Physician;Nurse;   Physician Team Member [facility] Samy   Patient/Family Present   Patient Present No   Patient's Family Present No   Pt visible, denies SI/HI/AVH  Pt reports some depression but states he is looking forward to bed search starting  Bed search to start tomorrow

## 2023-03-29 NOTE — PROGRESS NOTES
03/29/23 7220   Activity/Group Checklist   Group Community meeting   Attendance Attended   Attendance Duration (min) 46-60   Interactions Interacted appropriately   Affect/Mood Appropriate   Goals Achieved Able to listen to others; Able to engage in interactions; Able to self-disclose; Able to recieve feedback; Able to give feedback to another

## 2023-03-29 NOTE — NURSING NOTE
Patient has been about the unit  Pleasant, calm and cooperative  Denies SI, reports decreased depression  Compliant with medications  No current complaints  Rehab bed search to begin which patient is looking forward to

## 2023-03-29 NOTE — PROGRESS NOTES
SALINAS Group Note     03/29/23 1415   Activity/Group Checklist   Group Life Skills  (Coping Skills [employer])   Attendance Attended   Attendance Duration (min) 46-60   Interactions Interacted appropriately   Affect/Mood Appropriate;Bright   Goals Achieved Identified feelings; Identified triggers; Discussed coping strategies; Able to listen to others; Able to engage in interactions; Able to reflect/comment on own behavior;Able to self-disclose; Able to recieve feedback; Able to give feedback to another

## 2023-03-29 NOTE — PROGRESS NOTES
"Progress Note - Shaina [age] y o  male MRN: [id number]  Unit/Bed#: [facility] 340-01 Encounter: [id number]    Assessment/Plan   Principal Problem:    Major depressive disorder, recurrent, unspecified ([address] )  Active Problems:    Elevated blood pressure reading    Medical clearance for psychiatric admission    Vitamin D deficiency    Elevated lipids    Class 3 severe obesity due to excess calories without serious comorbidity with body mass index (BMI) of 45 0 to 49 9 in [facility])      Recommended Treatment:   Continue Lexapro 10 mg daily, for mood/anxiety  Continue naltrexone 50 mg daily, for alcohol use disorder    Continue with group therapy, milieu therapy and occupational therapy  Continue frequent safety checks and vitals per unit protocol  Case discussed with treatment team   Continue with SLIM medical management as indicated  Continue coordinating with case management regarding disposition  Risks, benefits and possible side effects of Medications: Risks, benefits, and possible side effects of medications have been explained to the patient, who verbalizes understanding    Legal Status: 201  ------------------------------------------------------------    Subjective: Per nursing report, Rickey Hartmann has been \"visible on unit   Patient reported to having some depression, but have hop (sic) for his discharge distention  Patient denies SI,HI,and  AVH   \"    Today, patient describes his mood as \"good\" and reports having \"the best sleep I've had in years\" last night  He reports some continued depression but states that his appetite has been improving and he has been attending groups  In particular, patient states he was participating in music group yesterday and realized how good of a coping mechanism music can be for him  Patient states that he spent last night talking and playing with peers on the unit and afterwards was immediately able to fall asleep   Patient states that he tried to call " "his wife several times but could not reach her because she keeps her phone on \"Do not Disturb\" for unknown numbers  Patient states that he intends to call his father-in-law, who is also in recovery for alcoholism and who has always been supportive of the patient, if he could get in contact with his wife and let her know that he would like to talk with her  Patient says that he would hopefully like to have a conversation with his wife tonight, after she finishes her regular therapy session, when she may be in a better space to talk  Patient asked if he could use his cell-phone during this time to [address] his wife so he could also see his 20-month old son at the same time  We told patient that this should not be a problem and let staff know that this would be okay from our standpoint  Patient states that tomorrow he would talk with treatment team and therapist about how it went  PRNs overnight: None  VS: Reviewed, within normal limits    Progress Toward Goals: Noticable improvement    Psychiatric Review of Systems:  Behavior over the last 24 hours: unchanged  Sleep: normal and improving  Appetite: adequate, improving  Medication side effects: none verbalized  ROS: Complete review of systems is negative except as noted above      Vital signs in last 24 hours:  Temp:  [97 °F (36 1 °C)-97 4 °F (36 3 °C)] 97 °F (36 1 °C)  HR:  [74-81] 81  Resp:  [16-18] 16  BP: (138-147)/(77-83) 138/77    Mental Status Exam:  Appearance:  age appropriate, casually dressed, dressed appropriately, adequate grooming   Behavior:  pleasant, cooperative, calm   Speech:  normal rate and volume   Mood:  \"Good\"   Affect:  constricted   Thought Process:  organized, logical, coherent, goal directed   Associations: intact associations   Thought Content:  no overt delusions   Perceptual Disturbances: Denies auditory or visual hallucinations and Does not appear to be responding to internal stimuli   Risk Potential: Suicidal ideation - None at " present  Homicidal ideation - None at present  Potential for aggression - Not at present   Sensorium:  oriented to person, place and time/date   Memory:  recent and remote memory grossly intact   Consciousness:  alert and awake   Attention/Concentration: attention span and concentration are age appropriate   Insight:  limited   Judgment: limited   Gait/Station: normal gait/station   Motor Activity: no abnormal movements     Current Medications:  Current Facility-Administered Medications   Medication Dose Route Frequency Provider Last Rate   • acetaminophen  650 mg Oral Q6H PRN Magy Swift MD     • acetaminophen  650 mg Oral Q4H PRN Magy Swift MD     • acetaminophen  975 mg Oral Q6H PRN Magy Swift MD     • aluminum-magnesium hydroxide-simethicone  30 mL Oral Q4H PRN Magy Swift MD     • benztropine  1 mg Intramuscular Q4H PRN Max 6/day Magy Swift MD     • benztropine  1 mg Oral Q4H PRN Max 6/day Magy Swift MD     • hydrOXYzine HCL  50 mg Oral Q6H PRN Max 4/day Magy Swift MD      Or   • diphenhydrAMINE  50 mg Intramuscular Q6H PRN Magy Swift MD     • ergocalciferol  50,000 Units Oral Weekly Adrianne Stevenson PA-C     • escitalopram  10 mg Oral Daily Acosta Ramos MD     • folic acid  1 mg Oral Daily Magy Swift MD     • glycerin-hypromellose-  1 drop Both Eyes Q3H PRN Magy Swift MD     • hydrochlorothiazide  12 5 mg Oral Daily Magy Swift MD     • hydrOXYzine HCL  100 mg Oral Q6H PRN Max 4/day Magy Swift MD      Or   • LORazepam  2 mg Intramuscular Q6H PRN Magy Swift MD     • hydrOXYzine HCL  25 mg Oral Q6H PRN Max 4/day Magy Swift MD     • LORazepam  1 mg Oral Q4H PRN Max 6/day Ventura Medeiros MD     • multivitamin-minerals  1 tablet Oral Daily Magy Swift MD     • naltrexone  50 mg Oral Daily Acosta Ramos MD     • OLANZapine  5 mg Oral Q4H PRN Max 3/day Jun Claire MD      Or   • OLANZapine  2 5 mg Intramuscular Q4H PRN Max 3/day Jun Claire MD     • OLANZapine  5 mg Oral Q3H PRN Max 3/day Jun Claire MD      Or   • OLANZapine  5 mg Intramuscular Q3H PRN Max 3/day Jun Claire MD     • OLANZapine  2 5 mg Oral Q4H PRN Max 6/day Jun Claire MD     • polyethylene glycol  17 g Oral Daily PRN Jun Claire MD     • propranolol  10 mg Oral Q8H PRN Jun Claire MD     • senna-docusate sodium  1 tablet Oral Daily PRN Jun Claire MD     • thiamine  100 mg Oral Daily Jun Claire MD     • traZODone  50 mg Oral HS PRN Jun Claire MD         Behavioral Health Medications: all current active meds have been reviewed  Changes as in plan section above  Laboratory results:  I have personally reviewed all pertinent laboratory/tests results  No results found for this or any previous visit (from the past 48 hour(s))  This note has been constructed using a voice recognition system  There may be translation, syntax, or grammatical errors  If you have any questions, please contact the dictating author      Flavia Arredondo MD PGY1

## 2023-03-30 VITALS
HEIGHT: 68 IN | HEART RATE: 65 BPM | SYSTOLIC BLOOD PRESSURE: 158 MMHG | OXYGEN SATURATION: 97 % | DIASTOLIC BLOOD PRESSURE: 74 MMHG | RESPIRATION RATE: 16 BRPM | BODY MASS INDEX: 47.71 KG/M2 | TEMPERATURE: 97 F | WEIGHT: 314.8 LBS

## 2023-03-30 RX ADMIN — FOLIC ACID 1 MG: 1 TABLET ORAL at 08:02

## 2023-03-30 RX ADMIN — MULTIPLE VITAMINS W/ MINERALS TAB 1 TABLET: TAB ORAL at 08:02

## 2023-03-30 RX ADMIN — HYDROCHLOROTHIAZIDE 12.5 MG: 25 TABLET ORAL at 08:02

## 2023-03-30 RX ADMIN — THIAMINE HCL TAB 100 MG 100 MG: 100 TAB at 08:02

## 2023-03-30 RX ADMIN — ESCITALOPRAM OXALATE 10 MG: 10 TABLET ORAL at 08:02

## 2023-03-30 RX ADMIN — NALTREXONE HYDROCHLORIDE 50 MG: 50 TABLET, FILM COATED ORAL at 08:02

## 2023-03-30 NOTE — DISCHARGE INSTR - APPOINTMENTS
Radha Kaiser or Misti, our Demetrius and Delvin, will be calling you after your discharge, on the phone number that you provided  They will be available as an additional support, if needed  If you wish to speak with one of them, you may contact Arlyn Quiles at [phone] or Alberto Christopher at [phone]

## 2023-03-30 NOTE — PROGRESS NOTES
Discussed with patient: AUDIT score of 18 UDS/Identified Substance(s) used: alcohol  Risks discussed included: health risks, mental health risks, legal risks, relationship conflict, financial risks  Recommendations discussed: inpatient d/a rehab  Patient's response: Pt has been agreeable for inpatient rehab since admission  Pt to dc to [facility] for inpatient d/a treatment

## 2023-03-30 NOTE — PROGRESS NOTES
03/30/23 0112   Activity/Group Checklist   Group Community meeting   Attendance Attended   Attendance Duration (min) 16-30   Interactions Interacted appropriately   Affect/Mood Appropriate   Goals Achieved Able to listen to others; Able to engage in interactions; Able to self-disclose; Able to give feedback to another;Able to recieve feedback

## 2023-03-30 NOTE — DISCHARGE SUMMARY
"  Discharge Summary - Shaina Pepe 45 y o  male MRN: [id number]  Unit/Bed#: U 340-01 Encounter: [id number]     Admission Date:   Admission Orders (From admission, onward)     Ordered        03/20/23 1527  ED TO SAME [facility] UNIT (using Admission Navigator) - Admit Patient to [address] [place] [address]  Once                          Discharge Date: 03/30/23     Attending Psychiatrist: Lorenzo Lozoya MD     Discharge Diagnosis:   Principal Problem:    Major depressive disorder, recurrent, unspecified ([address] )  Active Problems:    Elevated blood pressure reading    Vitamin D deficiency    Elevated lipids    Class 3 severe obesity due to excess calories without serious comorbidity with body mass index (BMI) of 45 0 to 49 9 in [facility])    Insomnia      Reason for Admission:   Iker Mckeon is a 45 y o  male who initially presented with depression and suicidal ideation  He was admitted to the psychiatric unit on a voluntary 201 commitment  Please see initial H&P by Dr Mark Yates on 3/21/23 below for full details:    \"Angel Luis Spain is a 45 y o  male with a history of depression and alcohol use who was admitted to the inpatient psychiatric unit on a voluntary 201 commitment basis due to depression and suicidal ideation      Symptoms prior to admission included worsening depression, suicidal ideation, hopelessness, poor appetite, difficulty sleeping, anxiety symptoms and poor self-care  Stressors preceding admission included alcohol use problems, family problems, job loss, being homeless, limited support, social difficulties and everyday stressors  Records reviewed  Patient presented to the [address] [place] [address] ED due to increased depression and suicidal ideation  He was calm and cooperative with evaluation process and was seen by crisis worker and then by psychiatric consult service  He reported significant stressors including his job loss, divorce, and losing his home   He endorsed " alcohol use as a significant stressor as well  He was endorsing SI but was feeling safe in the ED  He was agreeable to signing a 201 for voluntary admission  On arrival to the unit, he has remained calm and cooperative and has been without any acute behavioral issues      Sonja Mata is pleasant and cooperative throughout interview  He is visibly depressed and constricted in affect throughout interview but remains appropriate  He reiterates the above that he has been having substantial stressors in his life including the end of his marriage  Patient states that he has chronically struggled with alcohol abuse and this and an affair that he had had created a significant strain on his marriage  He reports that he was  for 5 years but his wife left him about 6 weeks ago and more recently had filed for divorce  Patient states that he has been feeling depressed increasingly for the past 10 days but does believe that he was self-medicating for depression and anxiety prior to this and believes that this is why he has been drinking so much throughout his life  He believes that he self-destruct when things are going well in his life and notes that this has happened in other areas of his life 2, including the [employer]  Patient reports that in addition to this separation from his wife and his alcohol abuse, he had recently lost his job after he was caught drinking on the job  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 and had access to alcohol frequently  He states that he had previously been drinking on the job but this was the first time that he was caught  Patient notes that his wife had taken their son and the patient reports that he has been living out of hotels recently  In addition to depressed mood, patient endorses anhedonia, poor sleep, poor appetite, decreased energy, hopelessness, feelings of guilt, poor concentration, and suicidal thoughts    Patient reports that he had presented "to the hospital due to having worsening suicidal thoughts and believes that he would eventually act on those thoughts  Patient describes a plan of going to an area with heavy drug use and overdosing on heroin  Of note, the patient reports that he does not use heroin and that him using heroin would be with the intention of suicide  He reports that he is not currently having suicidal thoughts while he is here and reports feeling safe in the hospital   He denies any HI or AVH  He denies any history consistent with césar or hypomania  He reports that he does feel generally anxious and has had a history of recent panic attacks  He is amenable to medication management for depression and anxiety and is amenable to treatment for alcohol use disorder including potential for transfer to inpatient rehab once stable  Discussed initiation of Lexapro and patient was agreeable to this  \"    Hospital Course:   Prior to admission pt was not on any psychiatric medications  The patient was admitted to the inpatient psychiatric unit and started on behavioral health checks every 7 minutes per unit protocol  Upon admission, the patient was evaluated by the medical service for medical clearance and further management of medical issues as needed  At the start of hospitalization, he was exhibiting symptoms of depression and suicidal ideation  During hospitalization, Biswas Appl was encourage to participate in group therapy, milieu therapy, and occupational therapy  Patient was admitted on a 201 voluntary basis for treatment  Upon evaluation, he was started on Lexapro 5 mg qd to address symptoms of depression  Patient was also placed on Naltrexone 25 mg qd to address symptoms of alcohol abuse  Possible side effects were discussed with the patient prior to initiation, and he verbalized understanding  Medications were appropriately titrated to Lexapro 10 mg qd and Naltrexone 50 mg qd      The patient adhered to his " "medication regimen and denied any acute adverse effects not otherwise mentioned  His symptoms began to improve, and his affect brightened throughout the course of psychiatric management  He reported improvements in sleep, appetite, and energy level  He was seen in [facility] interacting appropriately with peers and participating in group therapy  Adonis Hooker did not demonstrate dangerous behavior or thoughts to self or peers leading up to day of discharge  As he began to improve, the treatment team agreed he was safe for discharge with plan to continue outpatient treatment  On the day of discharge, Adonis Hooker denied suicidal or homicidal ideations, as well as auditory and visual hallucinations  He reported feeling \"Good  \" His goals are to continue to work on his sobriety while in rehab, speak with his wife, get in shape and eventually move down to [place] to for a new job opportunity so he can continue make positive changes in his life  Applicable follow up and safety plan was reviewed with the patient prior to discharge  Patient stated that if he were to have imminent thoughts of suicide that he would reach out to his support system, call 911 or bring himself into the hospital  Patient was also informed about the 65 hotline number as an additional resource in the case of an acute mental health crisis  Patient denies any access to firearms, weapons or stockpiles of old medications at home  I reviewed with Adonis Hooker the importance of compliance with medications and outpatient treatment after discharge  I discussed the medication regimen and possible side effects of the medications with Adonis Hooker prior to discharge  At the time of discharge he was tolerating psychiatric medications  , I discussed outpatient follow up with Adonis Hooker , I reviewed with Adonis Hooker crisis plan and safety plan upon discharge  and Adonis Hooker was competent to understand risks and benefits of withholding information and risks and benefits of his actions      Discharge " Medications  Psychiatric medications include:  • Lexapro 10 mg qd  • Naltrexone 50 mg qd    Other home medications for medical conditions were continued throughout hospitalization, if applicable  See AVS for more details  Patient was given one month supply of these medications at [address] [place] [address]  Follow ups: The patient is scheduled for follow up with Marcio Starks on 3/30 at 1:30 pm for further care  Risk of Harm to Self:   The following ratings are based on assessment at the time of discharge  Demographic risk factors include: , male  Historical Risk Factors include: history of depression, history of anxiety, history of suicide attempt, alcohol use, history of legal problems  Current Specific Risk Factors include: recent inpatient psychiatric admission - being discharged today, recent suicidal ideation, diagnosis of depression, chronic anxiety symptoms, alcohol use, ongoing depressive symptoms  Protective Factors: no current suicidal ideation, stable mood, improved anxiety symptoms, ability to adapt to change, ability to manage anger well, ability to make plans for the future, no current suicidal plan or intent, being discharged to a supportive environment (inpatient D&A rehab), being a parent, compliant with medications, compliant with mental health treatment, connection to own children, effective coping skills, effective decision-making skills, effective problem solving skills, good health, good self-esteem, having a desire to be alive, having a sense of purpose or meaning in life, impulse control, medical compliance, resiliency, responsibilities and duties to others, restricted access to lethal means, sense of determination, sense of personal control, sobriety, supportive father-in-law  Weapons/Firearms: none   The following steps have been taken to ensure weapons are properly secured: not applicable  Based on "today's assessment, Kash Flores presents the following risk of harm to self: minimal    Risk of Harm to Others: The following ratings are based on assessment at the time of discharge  Demographic Risk Factors include: male, under age 36  Historical Risk Factors include: alcohol abuse, prior arrest   Current Specific Risk Factors include: multiple stressors  Protective Factors: no current homicidal ideation, stable mood, improved mood, compliant with medications, compliant with treatment, willing to continue psychiatric treatment, willing to remain free from substance use, being discharged to a supportive environment (inpatient [facility] facility), ability to adapt to change, able to manage anger well, effective coping skills, supportive father-in-law  Weapons/Firearms: none  The following steps have been taken to ensure weapons are properly secured: not applicable  Based on today's assessment, Kash Flores presents the following risk of harm to others: minimal    Labs/Imaging:   I have personally reviewed all pertinent laboratory/tests results      Mental Status Exam:  Appearance:  age appropriate, dressed appropriately, looks stated age  sitting comfortably in chair, adequate hygiene and grooming, cooperative with interview, good eye contact    Behavior:  calm and cooperative   Speech:  normal rate, normal volume, normal pitch, fluent, clear and coherent   Mood:  \"Good\"   Affect:  euthymic and mildly constricted   Language: Within normal limits   Thought Process:  organized, logical, goal directed, normal rate of thoughts   Thought Content:  no verbalized delusions or overt paranoia   Perceptual Disturbances: no reported hallucinations and does not appear to be responding to internal stimuli at this time   Risk Potential: No active or passive suicidal or homicidal ideation was verbalized during interview   Sensorium:  person, place, time and current situation   Cognition:  Grossly intact " Consciousness:  alert and awake   Attention: attention span and concentration were age appropriate   Intellect:   appears to be of average intelligence     Motor: no abnormal movements  normal gait/station   Insight:  fair   Judgement: fair     Discharge Medications:  See list below, as well as the after visit summary containing reconciled discharge medications provided to patient and family        Current Facility-Administered Medications   Medication Dose Route Frequency Provider Last Rate   • acetaminophen  650 mg Oral Q6H PRN Magy Swift MD     • acetaminophen  650 mg Oral Q4H PRN Magy Swift MD     • acetaminophen  975 mg Oral Q6H PRN Magy Swift MD     • aluminum-magnesium hydroxide-simethicone  30 mL Oral Q4H PRN Magy Swift MD     • benztropine  1 mg Intramuscular Q4H PRN Max 6/day Magy Swift MD     • benztropine  1 mg Oral Q4H PRN Max 6/day Magy Swift MD     • hydrOXYzine HCL  50 mg Oral Q6H PRN Max 4/day Magy Swift MD      Or   • diphenhydrAMINE  50 mg Intramuscular Q6H PRN Magy Swift MD     • ergocalciferol  50,000 Units Oral Weekly Adrianne Stevenson PA-C     • escitalopram  10 mg Oral Daily Acosta Ramos MD     • folic acid  1 mg Oral Daily Magy Swift MD     • glycerin-hypromellose-  1 drop Both Eyes Q3H PRN Magy Swift MD     • hydrochlorothiazide  12 5 mg Oral Daily Magy Swift MD     • hydrOXYzine HCL  100 mg Oral Q6H PRN Max 4/day Magy Swift MD      Or   • LORazepam  2 mg Intramuscular Q6H PRN Magy Swift MD     • hydrOXYzine HCL  25 mg Oral Q6H PRN Max 4/day Magy Swift MD     • LORazepam  1 mg Oral Q4H PRN Max 6/day Ventura Medeiros MD     • multivitamin-minerals  1 tablet Oral Daily Magy Swift MD     • naltrexone  50 mg Oral Daily Acosta Ramos MD     • OLANZapine  5 mg Oral Q4H PRN Max 3/day Magy Swift MD      Or   • OLANZapine  2 5 mg Intramuscular Q4H PRN Max 3/day Fredy Ruiz MD     • OLANZapine  5 mg Oral Q3H PRN Max 3/day Fredy Ruiz MD      Or   • OLANZapine  5 mg Intramuscular Q3H PRN Max 3/day Fredy Ruiz MD     • OLANZapine  2 5 mg Oral Q4H PRN Max 6/day Fredy Ruiz MD     • polyethylene glycol  17 g Oral Daily PRN Fredy Ruiz MD     • propranolol  10 mg Oral Q8H PRN Fredy Ruiz MD     • senna-docusate sodium  1 tablet Oral Daily PRN Fredy Ruiz MD     • thiamine  100 mg Oral Daily Fredy Ruiz MD     • traZODone  50 mg Oral HS PRN Fredy Ruiz MD          Discharge instructions/Information to patient and family:   See after visit summary for information provided to patient and family  Provisions for Follow-Up Care:  See after visit summary for information related to follow-up care and any pertinent home health orders  This note has been constructed using a voice recognition system  There may be translation, syntax,  or grammatical errors  If you have any questions, please contact the dictating provider      Mojgan Bradshaw MD PGY 1

## 2023-03-30 NOTE — NURSING NOTE
Patient about the unit  Pleasant, calm and cooperative  Denies symptoms  Looking forward to transfer to rehab this afternoon

## 2023-03-30 NOTE — PLAN OF CARE
Problem: DISCHARGE PLANNING  Goal: Discharge to home or other facility with appropriate resources  Description: INTERVENTIONS:  - Identify barriers to discharge w/patient and caregiver  - Arrange for needed discharge resources and transportation as appropriate  - Identify discharge learning needs (meds, wound care, etc )  - Arrange for interpretive services to assist at discharge as needed  - Refer to Case Management Department for coordinating discharge planning if the patient needs post-hospital services based on physician/advanced practitioner order or complex needs related to functional status, cognitive ability, or social support system  Outcome: Completed     Problem: SELF HARM/SUICIDALITY  Goal: Will have no self-injury during hospital stay  Description: INTERVENTIONS:  - Q 15 MINUTES: Routine safety checks  - Q WAKING SHIFT & PRN: Assess risk to determine if routine checks are adequate to maintain patient safety  - Encourage patient to participate actively in care by formulating a plan to combat response to suicidal ideation, identify supports and resources  Outcome: Completed     Problem: DEPRESSION  Goal: Will be euthymic at discharge  Description: INTERVENTIONS:  - Administer medication as ordered  - Provide emotional support via 1:1 interaction with staff  - Encourage involvement in milieu/groups/activities  - Monitor for social isolation  Outcome: Completed     Problem: ANXIETY  Goal: Will report anxiety at manageable levels  Description: INTERVENTIONS:  - Administer medication as ordered  - Teach and encourage coping skills  - Provide emotional support  - Assess patient/family for anxiety and ability to cope  Outcome: Completed  Goal: By discharge: Patient will verbalize 2 strategies to deal with anxiety  Description: Interventions:  - Identify any obvious source/trigger to anxiety  - Staff will assist patient in applying identified coping technique/skills  - Encourage attendance of scheduled groups and activities  Outcome: Completed     Problem: SUBSTANCE USE/ABUSE  Goal: Will have no detox symptoms and will verbalize plan for changing substance-related behavior  Description: INTERVENTIONS:  - Monitor physical status and assess for symptoms of withdrawal  - Administer medication as ordered  - Provide emotional support with 1 on 1 interaction with staff  - Encourage recovery focused program/ addiction education  - Assess for verbalization of changing behaviors related to substance abuse  - Initiate consults and referrals as appropriate (Case Management, Spiritual Care, etc )  Outcome: Completed  Goal: By discharge, will develop insight into their chemical dependency and sustain motivation to continue in recovery  Description: INTERVENTIONS:  - Attends all daily group sessions and scheduled AA groups  - Actively practices coping skills through participation in the therapeutic community and adherence to program rules  - Reviews and completes assignments from individual treatment plan  - Assist patient development of understanding of their personal cycle of addiction and relapse triggers  Outcome: Completed  Goal: By discharge, patient will have ongoing treatment plan addressing chemical dependency  Description: INTERVENTIONS:  - Assist patient with resources and/or appointments for ongoing recovery based living  Outcome: Completed     Problem: Ineffective Coping  Goal: Participates in unit activities  Description: Interventions:  - Provide therapeutic environment   - Provide required programming   - Redirect inappropriate behaviors   Outcome: Completed

## 2023-03-30 NOTE — NURSING NOTE
Medication and discharge instructions reviewed with patient prior to discharge  Medications delivered to unit for patient from [place] [address]

## 2023-03-30 NOTE — PLAN OF CARE
Pt to dc today  Pt denies SI/HI, AVH  Pt oriented x3  Pt to dc to [facility] for inpatient rehab and will be picked up by [facility] at 1330   Indigent medications $74 55      dc address:[address] [place] [address] [phone]

## 2023-03-30 NOTE — PROGRESS NOTES
03/30/23 0834   Team Meeting   Meeting Type Daily Rounds   Team Members Present   Team Members Present Physician;Nurse   Physician Team Member [address] Team Member [facility] Management Team Member yarelis   Patient/Family Present   Patient Present No   Patient's Family Present No   DC today

## 2023-03-30 NOTE — DISCHARGE INSTR - OTHER ORDERS
If you are experiencing a mental health emergency, you may call the [address] [place] [address] 24 hours a day, 7 days per week at [phone]  In [place], call [phone] [address] [facility]  [phone]    [facility] Toll Free: [phone]     HOW TO GET SUBSTANCE ABUSE HELP:  If you or someone you know has a drug or alcohol problem, there is help:  [address]: [address]: [phone]  An assessment is the first step  In addition to those listed there are other programs available in the area but assessment is best to determine an appropriate level of care  If you DO NOT have Medical Assistance (MA) or [employer], an assessment can be scheduled at one of these providers:  [address] [facility]  Elda Barron 13, [address]  [phone]   [facility]  [address] , [place], [address]  [address]  [address]  [facility]  R [facility] 70  7[address] [facility], [address] [facility]   [address] , [place], 98 [facility]  [address] [facility] , [place], 98 [facility]  812 [facility] , [address], [place], [address]  [phone]     If you [address], an assessment can be scheduled at one of these providers:  [place] [facility]  [address] , [place], 98 [facility]  [address]  [facility] [address], [address]  [address]  D&A Intake Unit  620 [facility]  [address] , 1st Floor, [place], [address] [facility] [address]  [phone]  [address], 300 [facility],6Th Floor, [place], 4420 [facility] 5[address] [facility]  [address] , [place], [address]  Amanda Munguia 25 Rehabilitations Services  [address]  [facility]  [phone]   NET ([facility])  [address] [facility], [address]  197 [facility]  2 [facility], 105 [facility]   Step [address] , [place] 98 [facility]  [address] [place] , [place] 98 [facility]  [address], [place], [address]  [phone]     If you [address], an assessment can be scheduled at one of these providers  Please contact these Providers to determine if they are in your network plan:  [facility] D[facility] Intake [facility]  620 [facility] [address] , 1st Floor, [place], [address]  [address]  [address] , [place], [address]  [phone]   2600 [facility]  100 [facility]  [phone]   NET ([facility])  [address] [facility], [place], [address]  197 [facility]  1755 [facility] [address] [facility] [place] [facility]  [address] , [address], [place], [address] [facility]      Outpatient Drug & Alcohol Treatment   The [facility] currently operates an outpatient treatment unit:  [facility]FFIL[facility]ATED WITH [place], [place] as a functional unit of the [address]  The Outpatient treatment units are licensed by the [place] Department of Drug & Alcohol Programs to provide individual and group counseling for those with substance abuse and dependency problems  The clinical staff is experienced in a variety of therapeutic modalities and provides treatment that is individualized to meet the particular needs of each person  These units are drug-free treatment programs    The Commission accepts most major healthcare insurance coverage plans, [place] Medical Assistance and in those cases where the consumer has no third party healthcare coverage, a liberal sliding fee schedule is utilized  The length of service and type of outpatient service provided is based on the results of the Level of Care Assessment            There are currently three treatment protocols available: Outpatient  Intensive Outpatient  Contracted services for Partial Hospitalization  Therapy is provided in both Individual and Group counseling formats  The Outpatient department offers individual counseling for the family members of substance abusers to address co-dependent and enabling behaviors  Outpatient treatment services in BEHAVIORAL MEDICINE AT [facility] are purchased through a fee-for-service subcontract with:  [place] [facility]  [address] [facility], [address]   Phone: [phone]    1221 [facility], 275 [facility] 9881  [place], [address]   New Admissions [phone]  Local office [phone]    Outpatient treatment services in [facility] are purchased through fee-for-service subcontracts with:  [address]  [address]  Phone: [phone],  Gianna Combs [facility]  Phone: [address] [facility] [phone] / [address] 98 [phone]  Outpatient treatment services are also available to [facility] residents in our [employer]  When you need someone to listen, the Alayna Piña is available for 16 hours a day, 7 days a week, from the time of 7-10am and 2pm-2am   It is not available from the hours of 2am-6am and 10am-2pm  A representative can be reached at [phone]

## 2023-03-30 NOTE — BH TRANSITION RECORD
Contact Information: If you have any questions, concerns, pended studies, tests and/or procedures, or emergencies regarding your inpatient behavioral health visit  Please contact [facility] behavioral health unit 3B [phone] and ask to speak to a , nurse or physician  A contact is available 24 hours/ 7 days a week at this number  Summary of Procedures Performed During your Stay:  Below is a list of major procedures performed during your hospital stay and a summary of results:  - No major procedures performed  If studies are pending at discharge, follow up with your PCP and/or referring provider

## 2023-08-11 NOTE — PROGRESS NOTES
[facility] Group Note     03/21/23 1115 03/21/23 1400   Activity/Group Checklist   Group Life Skills  (Positive Vs  Negative Statements) Personal control  (Mindfulness Technique - 5 Senses Grounding)   Attendance Attended Attended   Attendance Duration (min) 0-15  (arrived at the end of group) 31-45  (arrived late to group)   Interactions Interacted appropriately Interacted appropriately  (but reserved and guarded at times)   Affect/Mood Calm Appropriate;Calm   Goals Achieved Able to listen to others; Able to engage in interactions; Able to recieve feedback; Able to give feedback to another  (received resources/benefited from social presence of group) Identified feelings; Identified triggers; Discussed coping strategies; Discussed self-esteem issues; Able to listen to others; Able to engage in interactions; Able to reflect/comment on own behavior;Able to recieve feedback; Able to give feedback to another no abrasions or lacerations